# Patient Record
Sex: FEMALE | Race: WHITE | Employment: FULL TIME | URBAN - METROPOLITAN AREA
[De-identification: names, ages, dates, MRNs, and addresses within clinical notes are randomized per-mention and may not be internally consistent; named-entity substitution may affect disease eponyms.]

---

## 2019-11-20 ENCOUNTER — OFFICE VISIT (OUTPATIENT)
Dept: FAMILY MEDICINE CLINIC | Age: 64
End: 2019-11-20
Payer: COMMERCIAL

## 2019-11-20 VITALS
SYSTOLIC BLOOD PRESSURE: 154 MMHG | OXYGEN SATURATION: 97 % | RESPIRATION RATE: 16 BRPM | WEIGHT: 132 LBS | HEART RATE: 61 BPM | DIASTOLIC BLOOD PRESSURE: 90 MMHG | TEMPERATURE: 97 F

## 2019-11-20 DIAGNOSIS — J01.90 ACUTE BACTERIAL SINUSITIS: Primary | ICD-10-CM

## 2019-11-20 DIAGNOSIS — B96.89 ACUTE BACTERIAL SINUSITIS: Primary | ICD-10-CM

## 2019-11-20 PROCEDURE — 99213 OFFICE O/P EST LOW 20 MIN: CPT | Performed by: PHYSICIAN ASSISTANT

## 2019-11-20 RX ORDER — LANSOPRAZOLE 15 MG/1
CAPSULE, DELAYED RELEASE ORAL
COMMUNITY
End: 2020-03-03

## 2019-11-20 RX ORDER — AMOXICILLIN 500 MG/1
500 CAPSULE ORAL 3 TIMES DAILY
Qty: 30 CAPSULE | Refills: 0 | Status: SHIPPED | OUTPATIENT
Start: 2019-11-20 | End: 2019-11-30

## 2019-11-20 RX ORDER — METHYLPREDNISOLONE 4 MG/1
TABLET ORAL
Qty: 1 KIT | Refills: 0 | Status: SHIPPED
Start: 2019-11-20 | End: 2020-03-03

## 2019-11-20 RX ORDER — ATENOLOL 50 MG/1
TABLET ORAL
COMMUNITY
Start: 2019-09-30 | End: 2020-03-03 | Stop reason: SDUPTHER

## 2019-11-20 RX ORDER — ATORVASTATIN CALCIUM 10 MG/1
TABLET, FILM COATED ORAL
COMMUNITY
Start: 2019-09-30 | End: 2020-03-03 | Stop reason: SDUPTHER

## 2019-11-20 RX ORDER — LEVOTHYROXINE SODIUM 25 MCG
TABLET ORAL
COMMUNITY
Start: 2019-09-30 | End: 2020-03-03 | Stop reason: SDUPTHER

## 2019-11-20 RX ORDER — BROMPHENIRAMINE MALEATE, PSEUDOEPHEDRINE HYDROCHLORIDE, AND DEXTROMETHORPHAN HYDROBROMIDE 2; 30; 10 MG/5ML; MG/5ML; MG/5ML
5 SYRUP ORAL 4 TIMES DAILY PRN
Qty: 120 ML | Refills: 0 | Status: SHIPPED
Start: 2019-11-20 | End: 2020-03-03

## 2019-11-20 RX ORDER — ALENDRONATE SODIUM 70 MG/1
TABLET ORAL
COMMUNITY
Start: 2019-09-30 | End: 2020-03-03

## 2019-11-20 ASSESSMENT — ENCOUNTER SYMPTOMS
WHEEZING: 0
COUGH: 1
EYE PAIN: 0
APNEA: 0
CHEST TIGHTNESS: 0
SINUS PRESSURE: 1
SORE THROAT: 1
STRIDOR: 0
RHINORRHEA: 1
CHOKING: 0
EYE ITCHING: 1
EYE REDNESS: 0
GASTROINTESTINAL NEGATIVE: 1
EYE DISCHARGE: 0
FACIAL SWELLING: 0
SHORTNESS OF BREATH: 0
SINUS PAIN: 1
VOICE CHANGE: 1
TROUBLE SWALLOWING: 0

## 2020-03-03 ENCOUNTER — OFFICE VISIT (OUTPATIENT)
Dept: PRIMARY CARE CLINIC | Age: 65
End: 2020-03-03
Payer: COMMERCIAL

## 2020-03-03 VITALS
SYSTOLIC BLOOD PRESSURE: 128 MMHG | HEIGHT: 61 IN | BODY MASS INDEX: 24.17 KG/M2 | WEIGHT: 128 LBS | DIASTOLIC BLOOD PRESSURE: 78 MMHG | TEMPERATURE: 98.1 F

## 2020-03-03 PROBLEM — E78.2 MIXED HYPERLIPIDEMIA: Chronic | Status: ACTIVE | Noted: 2020-03-03

## 2020-03-03 PROBLEM — I10 ESSENTIAL HYPERTENSION: Chronic | Status: ACTIVE | Noted: 2020-03-03

## 2020-03-03 PROBLEM — E03.9 ACQUIRED HYPOTHYROIDISM: Status: ACTIVE | Noted: 2020-03-03

## 2020-03-03 PROBLEM — G57.93 NEUROPATHY OF BOTH FEET: Status: ACTIVE | Noted: 2020-03-03

## 2020-03-03 PROBLEM — G57.93 NEUROPATHY OF BOTH FEET: Chronic | Status: ACTIVE | Noted: 2020-03-03

## 2020-03-03 PROBLEM — E03.9 ACQUIRED HYPOTHYROIDISM: Chronic | Status: ACTIVE | Noted: 2020-03-03

## 2020-03-03 PROBLEM — K21.9 GASTROESOPHAGEAL REFLUX DISEASE WITHOUT ESOPHAGITIS: Status: ACTIVE | Noted: 2020-03-03

## 2020-03-03 PROBLEM — K21.9 GASTROESOPHAGEAL REFLUX DISEASE WITHOUT ESOPHAGITIS: Chronic | Status: ACTIVE | Noted: 2020-03-03

## 2020-03-03 PROBLEM — I10 ESSENTIAL HYPERTENSION: Status: ACTIVE | Noted: 2020-03-03

## 2020-03-03 PROBLEM — E78.2 MIXED HYPERLIPIDEMIA: Status: ACTIVE | Noted: 2020-03-03

## 2020-03-03 PROCEDURE — G8427 DOCREV CUR MEDS BY ELIG CLIN: HCPCS | Performed by: FAMILY MEDICINE

## 2020-03-03 PROCEDURE — 4040F PNEUMOC VAC/ADMIN/RCVD: CPT | Performed by: FAMILY MEDICINE

## 2020-03-03 PROCEDURE — 1123F ACP DISCUSS/DSCN MKR DOCD: CPT | Performed by: FAMILY MEDICINE

## 2020-03-03 PROCEDURE — 4004F PT TOBACCO SCREEN RCVD TLK: CPT | Performed by: FAMILY MEDICINE

## 2020-03-03 PROCEDURE — 1090F PRES/ABSN URINE INCON ASSESS: CPT | Performed by: FAMILY MEDICINE

## 2020-03-03 PROCEDURE — G8400 PT W/DXA NO RESULTS DOC: HCPCS | Performed by: FAMILY MEDICINE

## 2020-03-03 PROCEDURE — G8420 CALC BMI NORM PARAMETERS: HCPCS | Performed by: FAMILY MEDICINE

## 2020-03-03 PROCEDURE — G8484 FLU IMMUNIZE NO ADMIN: HCPCS | Performed by: FAMILY MEDICINE

## 2020-03-03 PROCEDURE — 3017F COLORECTAL CA SCREEN DOC REV: CPT | Performed by: FAMILY MEDICINE

## 2020-03-03 PROCEDURE — 99204 OFFICE O/P NEW MOD 45 MIN: CPT | Performed by: FAMILY MEDICINE

## 2020-03-03 RX ORDER — OMEPRAZOLE 40 MG/1
40 CAPSULE, DELAYED RELEASE ORAL
Qty: 90 CAPSULE | Refills: 12 | Status: SHIPPED
Start: 2020-03-03 | End: 2021-06-03 | Stop reason: SDUPTHER

## 2020-03-03 RX ORDER — LEVOTHYROXINE SODIUM 0.03 MG/1
25 TABLET ORAL DAILY
Qty: 90 TABLET | Refills: 5 | Status: SHIPPED
Start: 2020-03-03 | End: 2021-06-03 | Stop reason: SDUPTHER

## 2020-03-03 RX ORDER — ATORVASTATIN CALCIUM 10 MG/1
10 TABLET, FILM COATED ORAL DAILY
Qty: 90 TABLET | Refills: 5 | Status: SHIPPED
Start: 2020-03-03 | End: 2021-06-03 | Stop reason: SDUPTHER

## 2020-03-03 RX ORDER — ATENOLOL 50 MG/1
50 TABLET ORAL DAILY
Qty: 90 TABLET | Refills: 5 | Status: SHIPPED
Start: 2020-03-03 | End: 2021-06-03 | Stop reason: SDUPTHER

## 2020-03-03 ASSESSMENT — PATIENT HEALTH QUESTIONNAIRE - PHQ9
SUM OF ALL RESPONSES TO PHQ QUESTIONS 1-9: 0
SUM OF ALL RESPONSES TO PHQ QUESTIONS 1-9: 0
SUM OF ALL RESPONSES TO PHQ9 QUESTIONS 1 & 2: 0
1. LITTLE INTEREST OR PLEASURE IN DOING THINGS: 0
2. FEELING DOWN, DEPRESSED OR HOPELESS: 0

## 2020-03-03 ASSESSMENT — ENCOUNTER SYMPTOMS
RESPIRATORY NEGATIVE: 1
EYES NEGATIVE: 1
ALLERGIC/IMMUNOLOGIC NEGATIVE: 1
GASTROINTESTINAL NEGATIVE: 1

## 2020-03-03 NOTE — PROGRESS NOTES
3/3/20  Name: Rowan Mckeon    : 1955    Sex: female    Age: 75 y.o. Subjective:  Chief Complaint: Patient is here for establish as a new patient. Patient presents today to establish as a new patient. I met her last year in [facility]. 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 She has not had a physical in over a year. She has been some stress since her boyfriend of 10 years  suddenly late last Labor Day while on her couch at 45years old. She has no chest pain or shortness of breath. He does describe some numbness and tingling in her feet and legs for the past several months. She thinks is from her shoes and being on her feet so much. Medical history history is positive for hypertension hyperlipidemia hypothyroidism gastroesophageal reflux disease      Surgical history is negative. Never had a colonoscopy        Social history she is a non-smoker  for 20 years he has 3 children a boy out  Of town,  boy in town and a girl in town. Her mother  from COPD Father  from CAD and diabetes at 67. [address] name is Radha Sun  2 brothers 2 sisters are well. Weight is down 10 pounds. 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 previous doc  jono      Review of Systems   Constitutional: Negative. HENT: Negative. Eyes: Negative. Respiratory: Negative. Cardiovascular: Negative. Gastrointestinal: Negative. Endocrine: Negative. Genitourinary: Negative. Musculoskeletal: Negative. Skin: Negative. Allergic/Immunologic: Negative. Neurological: Negative. Hematological: Negative. Psychiatric/Behavioral: Negative.           Current Outpatient Medications:     lansoprazole (PREVACID) 15 MG delayed release capsule, , Disp: , Rfl:     alendronate (FOSAMAX) 70 MG tablet, , Disp: , Rfl:     atenolol (TENORMIN) 50 MG tablet, , Disp: , Rfl:     atorvastatin (LIPITOR) 10 MG tablet, , Disp: , Rfl:     SYNTHROID 25 MCG tablet, , Disp: , Rfl:     methylPREDNISolone (MEDROL DOSEPACK) 4 MG tablet, Take by mouth., Disp: 1 kit, Rfl: 0    brompheniramine-pseudoephedrine-DM 2-30-10 MG/5ML syrup, Take 5 mLs by mouth 4 times daily as needed for Congestion or Cough, Disp: 120 mL, Rfl: 0  Allergies   Allergen Reactions    Sulfa Antibiotics      Social History     Socioeconomic History    Marital status:      Spouse name: Not on file    Number of children: Not on file    Years of education: Not on file    Highest education level: Not on file   Occupational History    Not on file   Social Needs    Financial resource strain: Not on file    Food insecurity:     Worry: Not on file     Inability: Not on file    Transportation needs:     Medical: Not on file     Non-medical: Not on file   Tobacco Use    Smoking status: Never Smoker    Smokeless tobacco: Never Used   Substance and Sexual Activity    Alcohol use: Not on file    Drug use: Not on file    Sexual activity: Not on file   Lifestyle    Physical activity:     Days per week: Not on file     Minutes per session: Not on file    Stress: Not on file   Relationships    Social connections:     Talks on phone: Not on file     Gets together: Not on file     Attends Sikh service: Not on file     Active member of club or organization: Not on file     Attends meetings of clubs or organizations: Not on file     Relationship status: Not on file    Intimate partner violence:     Fear of current or ex partner: Not on file     Emotionally abused: Not on file     Physically abused: Not on file     Forced sexual activity: Not on file   Other Topics Concern    Not on file   Social History Narrative     for 20 years. 3 children.   A boy and girl in town and one boy out of town    Boyfriend of 10 years  suddenly in 2019 well on her couch at age 45. 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 Hyperlipidemia    Hypertension    Hypothyroidism    GERD    Neuropathy both feet      No past medical history on file. No family history on file. No past surgical history on file. Vitals:    03/03/20 1127   BP: 128/78   Temp: 98.1 °F (36.7 °C)   Weight: 128 lb (58.1 kg)   Height: 5' 0.5\" (1.537 m)       Objective:    Physical Exam  Vitals signs reviewed. Constitutional:       Appearance: She is well-developed. HENT:      Head: Normocephalic. Eyes:      Pupils: Pupils are equal, round, and reactive to light. Neck:      Musculoskeletal: Normal range of motion. Cardiovascular:      Rate and Rhythm: Normal rate and regular rhythm. Pulmonary:      Effort: Pulmonary effort is normal.      Breath sounds: Normal breath sounds. Abdominal:      Palpations: Abdomen is soft. Musculoskeletal: Normal range of motion. Skin:     General: Skin is warm. Neurological:      Mental Status: She is alert and oriented to person, place, and time. Psychiatric:         Behavior: Behavior normal.         Kajal Frias was seen today for establish care. Diagnoses and all orders for this visit:    Essential hypertension    Mixed hyperlipidemia    Acquired hypothyroidism    Gastroesophageal reflux disease without esophagitis    Neuropathy of both feet        Comments: We will have her obtain full laboratory studies and see me in 2 weeks for complete physical with EKG. At that time we will discuss vitamins including B12 since she is on a H2 blocker. Also colonoscopy. Discussed also at that time the risk of long-term Prilosec. Check blood pressure weekly. .  Take thyroid meds without food. A great deal of time spent reviewing medications, diet, exercise, social issues.  Also reviewing the chart before entering the room with patient and finishing charting after leaving patient's room. More than half of that time was spent face to face with the patient in counseling and coordinating care. Great deal of time spent reviewing old records establishing treatment protocol treatment plan majority of the time spent on face-to-face exam and education. Follow Up: Return in about 2 weeks (around 3/17/2020) for with ekg that day ----lab  before.      Seen by:  Billy Aaron, DO

## 2020-03-11 ENCOUNTER — HOSPITAL ENCOUNTER (OUTPATIENT)
Age: 65
Discharge: HOME | End: 2020-03-13
Payer: COMMERCIAL

## 2020-03-11 LAB
ALBUMIN SERPL-MCNC: 4.7 G/DL (ref 3.5–5.2)
ALP BLD-CCNC: 74 U/L (ref 35–104)
ALT SERPL-CCNC: 59 U/L (ref 0–32)
ANION GAP SERPL CALCULATED.3IONS-SCNC: 22 MMOL/L (ref 7–16)
AST SERPL-CCNC: 57 U/L (ref 0–31)
BACTERIA: ABNORMAL /HPF
BASOPHILS ABSOLUTE: 0.03 E9/L (ref 0–0.2)
BASOPHILS RELATIVE PERCENT: 0.6 % (ref 0–2)
BILIRUB SERPL-MCNC: 0.6 MG/DL (ref 0–1.2)
BILIRUBIN URINE: NEGATIVE
BLOOD, URINE: NEGATIVE
BUN BLDV-MCNC: 10 MG/DL (ref 8–23)
CALCIUM SERPL-MCNC: 10.2 MG/DL (ref 8.6–10.2)
CHLORIDE BLD-SCNC: 89 MMOL/L (ref 98–107)
CHOLESTEROL, TOTAL: 204 MG/DL (ref 0–199)
CLARITY: CLEAR
CO2: 21 MMOL/L (ref 22–29)
COLOR: YELLOW
CREAT SERPL-MCNC: 0.7 MG/DL (ref 0.5–1)
EOSINOPHILS ABSOLUTE: 0.04 E9/L (ref 0.05–0.5)
EOSINOPHILS RELATIVE PERCENT: 0.8 % (ref 0–6)
EPITHELIAL CELLS, UA: ABNORMAL /HPF
GFR AFRICAN AMERICAN: >60
GFR NON-AFRICAN AMERICAN: >60 ML/MIN/1.73
GLUCOSE BLD-MCNC: 82 MG/DL (ref 74–99)
GLUCOSE URINE: NEGATIVE MG/DL
HBA1C MFR BLD: 5.3 % (ref 4–5.6)
HCT VFR BLD CALC: 40.8 % (ref 34–48)
HDLC SERPL-MCNC: 98 MG/DL
HEMOGLOBIN: 13.2 G/DL (ref 11.5–15.5)
IMMATURE GRANULOCYTES #: 0.02 E9/L
IMMATURE GRANULOCYTES %: 0.4 % (ref 0–5)
KETONES, URINE: 15 MG/DL
LDL CHOLESTEROL CALCULATED: 94 MG/DL (ref 0–99)
LEUKOCYTE ESTERASE, URINE: ABNORMAL
LYMPHOCYTES ABSOLUTE: 1.57 E9/L (ref 1.5–4)
LYMPHOCYTES RELATIVE PERCENT: 33.1 % (ref 20–42)
MCH RBC QN AUTO: 31.6 PG (ref 26–35)
MCHC RBC AUTO-ENTMCNC: 32.4 % (ref 32–34.5)
MCV RBC AUTO: 97.6 FL (ref 80–99.9)
MONOCYTES ABSOLUTE: 0.47 E9/L (ref 0.1–0.95)
MONOCYTES RELATIVE PERCENT: 9.9 % (ref 2–12)
NEUTROPHILS ABSOLUTE: 2.62 E9/L (ref 1.8–7.3)
NEUTROPHILS RELATIVE PERCENT: 55.2 % (ref 43–80)
NITRITE, URINE: NEGATIVE
PDW BLD-RTO: 12.3 FL (ref 11.5–15)
PH UA: 6 (ref 5–9)
PLATELET # BLD: 217 E9/L (ref 130–450)
PMV BLD AUTO: 10.6 FL (ref 7–12)
POTASSIUM SERPL-SCNC: 4.9 MMOL/L (ref 3.5–5)
PROTEIN UA: NEGATIVE MG/DL
RBC # BLD: 4.18 E12/L (ref 3.5–5.5)
RBC UA: ABNORMAL /HPF (ref 0–2)
SODIUM BLD-SCNC: 132 MMOL/L (ref 132–146)
SPECIFIC GRAVITY UA: 1.02 (ref 1–1.03)
T4 TOTAL: 6.2 MCG/DL (ref 4.5–11.7)
TOTAL PROTEIN: 7.4 G/DL (ref 6.4–8.3)
TRIGL SERPL-MCNC: 62 MG/DL (ref 0–149)
TSH SERPL DL<=0.05 MIU/L-ACNC: 1.47 UIU/ML (ref 0.27–4.2)
UROBILINOGEN, URINE: 0.2 E.U./DL
VITAMIN B-12: 357 PG/ML (ref 211–946)
VITAMIN D 25-HYDROXY: 90 NG/ML (ref 30–100)
VLDLC SERPL CALC-MCNC: 12 MG/DL
WBC # BLD: 4.8 E9/L (ref 4.5–11.5)
WBC UA: ABNORMAL /HPF (ref 0–5)

## 2020-03-11 PROCEDURE — 80061 LIPID PANEL: CPT

## 2020-03-11 PROCEDURE — 84207 ASSAY OF VITAMIN B-6: CPT

## 2020-03-11 PROCEDURE — 85025 COMPLETE CBC W/AUTO DIFF WBC: CPT

## 2020-03-11 PROCEDURE — 83036 HEMOGLOBIN GLYCOSYLATED A1C: CPT

## 2020-03-11 PROCEDURE — 84436 ASSAY OF TOTAL THYROXINE: CPT

## 2020-03-11 PROCEDURE — 82306 VITAMIN D 25 HYDROXY: CPT

## 2020-03-11 PROCEDURE — 81001 URINALYSIS AUTO W/SCOPE: CPT

## 2020-03-11 PROCEDURE — 84443 ASSAY THYROID STIM HORMONE: CPT

## 2020-03-11 PROCEDURE — 82607 VITAMIN B-12: CPT

## 2020-03-11 PROCEDURE — 80053 COMPREHEN METABOLIC PANEL: CPT

## 2020-03-11 PROCEDURE — 36415 COLL VENOUS BLD VENIPUNCTURE: CPT

## 2020-03-16 LAB — VITAMIN B6: 61.8 NMOL/L (ref 20–125)

## 2020-03-23 ENCOUNTER — TELEPHONE (OUTPATIENT)
Dept: PRIMARY CARE CLINIC | Age: 65
End: 2020-03-23

## 2020-03-23 NOTE — TELEPHONE ENCOUNTER
Left message to cancel appt for tomorrow.   Asked that she call back to reschedule after end of May or she can schedule e-visit through [address]

## 2020-06-23 ENCOUNTER — OFFICE VISIT (OUTPATIENT)
Dept: PRIMARY CARE CLINIC | Age: 65
End: 2020-06-23
Payer: COMMERCIAL

## 2020-06-23 ENCOUNTER — HOSPITAL ENCOUNTER (OUTPATIENT)
Age: 65
Discharge: HOME | End: 2020-06-25
Payer: COMMERCIAL

## 2020-06-23 VITALS
BODY MASS INDEX: 23.41 KG/M2 | DIASTOLIC BLOOD PRESSURE: 78 MMHG | TEMPERATURE: 98.4 F | SYSTOLIC BLOOD PRESSURE: 126 MMHG | WEIGHT: 124 LBS | HEIGHT: 61 IN

## 2020-06-23 PROBLEM — R79.89 ELEVATED LIVER FUNCTION TESTS: Status: ACTIVE | Noted: 2020-06-23

## 2020-06-23 PROBLEM — R79.89 ELEVATED LIVER FUNCTION TESTS: Chronic | Status: ACTIVE | Noted: 2020-06-23

## 2020-06-23 LAB
ALBUMIN SERPL-MCNC: 4.9 G/DL (ref 3.5–5.2)
ALP BLD-CCNC: 86 U/L (ref 35–104)
ALT SERPL-CCNC: 65 U/L (ref 0–32)
ANION GAP SERPL CALCULATED.3IONS-SCNC: 18 MMOL/L (ref 7–16)
AST SERPL-CCNC: 80 U/L (ref 0–31)
BILIRUB SERPL-MCNC: 0.4 MG/DL (ref 0–1.2)
BUN BLDV-MCNC: 14 MG/DL (ref 8–23)
CALCIUM SERPL-MCNC: 10.5 MG/DL (ref 8.6–10.2)
CHLORIDE BLD-SCNC: 90 MMOL/L (ref 98–107)
CO2: 24 MMOL/L (ref 22–29)
CREAT SERPL-MCNC: 0.7 MG/DL (ref 0.5–1)
GFR AFRICAN AMERICAN: >60
GFR NON-AFRICAN AMERICAN: >60 ML/MIN/1.73
GLUCOSE BLD-MCNC: 78 MG/DL (ref 74–99)
POTASSIUM SERPL-SCNC: 5.6 MMOL/L (ref 3.5–5)
SODIUM BLD-SCNC: 132 MMOL/L (ref 132–146)
TOTAL PROTEIN: 8 G/DL (ref 6.4–8.3)

## 2020-06-23 PROCEDURE — 4040F PNEUMOC VAC/ADMIN/RCVD: CPT | Performed by: FAMILY MEDICINE

## 2020-06-23 PROCEDURE — 80053 COMPREHEN METABOLIC PANEL: CPT

## 2020-06-23 PROCEDURE — 99214 OFFICE O/P EST MOD 30 MIN: CPT | Performed by: FAMILY MEDICINE

## 2020-06-23 PROCEDURE — 93000 ELECTROCARDIOGRAM COMPLETE: CPT | Performed by: FAMILY MEDICINE

## 2020-06-23 PROCEDURE — 1090F PRES/ABSN URINE INCON ASSESS: CPT | Performed by: FAMILY MEDICINE

## 2020-06-23 PROCEDURE — 3017F COLORECTAL CA SCREEN DOC REV: CPT | Performed by: FAMILY MEDICINE

## 2020-06-23 PROCEDURE — 1123F ACP DISCUSS/DSCN MKR DOCD: CPT | Performed by: FAMILY MEDICINE

## 2020-06-23 PROCEDURE — 36415 COLL VENOUS BLD VENIPUNCTURE: CPT

## 2020-06-23 PROCEDURE — G8427 DOCREV CUR MEDS BY ELIG CLIN: HCPCS | Performed by: FAMILY MEDICINE

## 2020-06-23 PROCEDURE — 1036F TOBACCO NON-USER: CPT | Performed by: FAMILY MEDICINE

## 2020-06-23 PROCEDURE — 86803 HEPATITIS C AB TEST: CPT

## 2020-06-23 PROCEDURE — G8400 PT W/DXA NO RESULTS DOC: HCPCS | Performed by: FAMILY MEDICINE

## 2020-06-23 PROCEDURE — G8420 CALC BMI NORM PARAMETERS: HCPCS | Performed by: FAMILY MEDICINE

## 2020-06-23 ASSESSMENT — PATIENT HEALTH QUESTIONNAIRE - PHQ9
SUM OF ALL RESPONSES TO PHQ9 QUESTIONS 1 & 2: 0
2. FEELING DOWN, DEPRESSED OR HOPELESS: 0
1. LITTLE INTEREST OR PLEASURE IN DOING THINGS: 0
SUM OF ALL RESPONSES TO PHQ QUESTIONS 1-9: 0
SUM OF ALL RESPONSES TO PHQ QUESTIONS 1-9: 0

## 2020-06-23 ASSESSMENT — ENCOUNTER SYMPTOMS
EYES NEGATIVE: 1
ALLERGIC/IMMUNOLOGIC NEGATIVE: 1
GASTROINTESTINAL NEGATIVE: 1
RESPIRATORY NEGATIVE: 1

## 2020-06-23 NOTE — PROGRESS NOTES
20  Name: Claudy Lowe    : 1955    Sex: female    Age: 77 y.o. Subjective:  Chief Complaint: Patient is here for  Ck  Re  Follow up on lab     Was seen  March and  Had alb  buet  Cancelled d ue to corona virus  Lab ntoed  Ros neg  Wt dwon  4 lbs  Ros neg  Related liver function studies. Lalit on prilosec on fe yrs for  gerd   Told will need to  Try taper  admits  etoh       Beer    8  per week   never had a trasfusion        Review of Systems   Constitutional: Negative. HENT: Negative. Eyes: Negative. Respiratory: Negative. Cardiovascular: Negative. Gastrointestinal: Negative. Endocrine: Negative. Genitourinary: Negative. Musculoskeletal: Negative. Skin: Negative. Allergic/Immunologic: Negative. Neurological: Negative. Hematological: Negative. Psychiatric/Behavioral: Negative.           Current Outpatient Medications:     atenolol (TENORMIN) 50 MG tablet, Take 1 tablet by mouth daily, Disp: 90 tablet, Rfl: 5    atorvastatin (LIPITOR) 10 MG tablet, Take 1 tablet by mouth daily, Disp: 90 tablet, Rfl: 5    levothyroxine (SYNTHROID) 25 MCG tablet, Take 1 tablet by mouth Daily, Disp: 90 tablet, Rfl: 5    omeprazole (PRILOSEC) 40 MG delayed release capsule, Take 1 capsule by mouth every morning (before breakfast), Disp: 90 capsule, Rfl: 12  Allergies   Allergen Reactions    Sulfa Antibiotics      Social History     Socioeconomic History    Marital status:      Spouse name: Not on file    Number of children: Not on file    Years of education: Not on file    Highest education level: Not on file   Occupational History    Not on file   Social Needs    Financial resource strain: Not on file    Food insecurity     Worry: Not on file     Inability: Not on file    Transportation needs     Medical: Not on file     Non-medical: Not on file   Tobacco Use    Smoking status: Never Smoker    Smokeless tobacco: Never Used   Substance and Sexual soft.   Musculoskeletal: Normal range of motion. Skin:     General: Skin is warm. Neurological:      Mental Status: She is alert and oriented to person, place, and time. Psychiatric:         Behavior: Behavior normal.         Rondell Peabody was seen today for discuss labs. Diagnoses and all orders for this visit:    Essential hypertension  -     EKG 12 lead; Future  -     EKG 12 lead    Mixed hyperlipidemia    Gastroesophageal reflux disease without esophagitis    Acquired hypothyroidism    Neuropathy of both feet    Elevated liver function tests  -     Comprehensive Metabolic Panel; Future  -     HEPATITIS C ANTIBODY; Future        Comments: We will repeat liver function studies today with a hepatitis C antibody titer. We will plan on seeing her 3 months unless liver function is still elevated we will do an ultrasound to discuss after that if necessary. If any symptoms will notify discontinue all alcohol. A great deal of time spent reviewing medications, diet, exercise, social issues. Also reviewing the chart before entering the room with patient and finishing charting after leaving patient's room. More than half of that time was spent face to face with the patient in counseling and coordinating care. Check blood pressure at home weekly. Take thyroid meds without food. Exercise. Follow Up: Return in about 3 months (around 9/23/2020) for lab before.      Seen by:  Lona Mitchell DO

## 2020-06-24 LAB — HEPATITIS C ANTIBODY INTERPRETATION: NORMAL

## 2020-06-29 ENCOUNTER — TELEPHONE (OUTPATIENT)
Dept: PRIMARY CARE CLINIC | Age: 65
End: 2020-06-29

## 2020-06-29 NOTE — TELEPHONE ENCOUNTER
Notify patient the liver functions were a little higher. I put an ultrasound in.   Please send the referral staff and have her see me in 2 weeks

## 2020-07-06 ENCOUNTER — TELEPHONE (OUTPATIENT)
Dept: PRIMARY CARE CLINIC | Age: 65
End: 2020-07-06

## 2020-07-10 NOTE — TELEPHONE ENCOUNTER
Patient returning call could not hear message.     Please advise patient,  [phone]    Thank you

## 2020-10-01 ENCOUNTER — HOSPITAL ENCOUNTER (OUTPATIENT)
Age: 65
Discharge: HOME | End: 2020-10-03
Payer: COMMERCIAL

## 2020-10-01 ENCOUNTER — OFFICE VISIT (OUTPATIENT)
Dept: PRIMARY CARE CLINIC | Age: 65
End: 2020-10-01
Payer: COMMERCIAL

## 2020-10-01 VITALS
RESPIRATION RATE: 14 BRPM | WEIGHT: 114 LBS | BODY MASS INDEX: 21.52 KG/M2 | SYSTOLIC BLOOD PRESSURE: 126 MMHG | HEIGHT: 61 IN | HEART RATE: 63 BPM | TEMPERATURE: 98.4 F | OXYGEN SATURATION: 96 % | DIASTOLIC BLOOD PRESSURE: 78 MMHG

## 2020-10-01 LAB
ALBUMIN SERPL-MCNC: 4.4 G/DL (ref 3.5–5.2)
ALP BLD-CCNC: 81 U/L (ref 35–104)
ALT SERPL-CCNC: 41 U/L (ref 0–32)
ANION GAP SERPL CALCULATED.3IONS-SCNC: 15 MMOL/L (ref 7–16)
AST SERPL-CCNC: 40 U/L (ref 0–31)
BASOPHILS ABSOLUTE: 0.05 E9/L (ref 0–0.2)
BASOPHILS RELATIVE PERCENT: 0.9 % (ref 0–2)
BILIRUB SERPL-MCNC: 0.3 MG/DL (ref 0–1.2)
BUN BLDV-MCNC: 11 MG/DL (ref 8–23)
CALCIUM SERPL-MCNC: 10.3 MG/DL (ref 8.6–10.2)
CHLORIDE BLD-SCNC: 96 MMOL/L (ref 98–107)
CHOLESTEROL, TOTAL: 193 MG/DL (ref 0–199)
CO2: 25 MMOL/L (ref 22–29)
CREAT SERPL-MCNC: 0.7 MG/DL (ref 0.5–1)
EOSINOPHILS ABSOLUTE: 0.06 E9/L (ref 0.05–0.5)
EOSINOPHILS RELATIVE PERCENT: 1.1 % (ref 0–6)
GFR AFRICAN AMERICAN: >60
GFR NON-AFRICAN AMERICAN: >60 ML/MIN/1.73
GLUCOSE BLD-MCNC: 96 MG/DL (ref 74–99)
HCT VFR BLD CALC: 40.7 % (ref 34–48)
HDLC SERPL-MCNC: 83 MG/DL
HEMOGLOBIN: 13.5 G/DL (ref 11.5–15.5)
IMMATURE GRANULOCYTES #: 0.01 E9/L
IMMATURE GRANULOCYTES %: 0.2 % (ref 0–5)
LDL CHOLESTEROL CALCULATED: 91 MG/DL (ref 0–99)
LYMPHOCYTES ABSOLUTE: 1.4 E9/L (ref 1.5–4)
LYMPHOCYTES RELATIVE PERCENT: 25.8 % (ref 20–42)
MCH RBC QN AUTO: 32.5 PG (ref 26–35)
MCHC RBC AUTO-ENTMCNC: 33.2 % (ref 32–34.5)
MCV RBC AUTO: 98.1 FL (ref 80–99.9)
MONOCYTES ABSOLUTE: 0.6 E9/L (ref 0.1–0.95)
MONOCYTES RELATIVE PERCENT: 11.1 % (ref 2–12)
NEUTROPHILS ABSOLUTE: 3.3 E9/L (ref 1.8–7.3)
NEUTROPHILS RELATIVE PERCENT: 60.9 % (ref 43–80)
PDW BLD-RTO: 12.7 FL (ref 11.5–15)
PLATELET # BLD: 203 E9/L (ref 130–450)
PMV BLD AUTO: 10.4 FL (ref 7–12)
POTASSIUM SERPL-SCNC: 3.8 MMOL/L (ref 3.5–5)
RBC # BLD: 4.15 E12/L (ref 3.5–5.5)
SODIUM BLD-SCNC: 136 MMOL/L (ref 132–146)
T4 TOTAL: 5.9 MCG/DL (ref 4.5–11.7)
TOTAL PROTEIN: 6.9 G/DL (ref 6.4–8.3)
TRIGL SERPL-MCNC: 97 MG/DL (ref 0–149)
TSH SERPL DL<=0.05 MIU/L-ACNC: 2.09 UIU/ML (ref 0.27–4.2)
VITAMIN D 25-HYDROXY: 78 NG/ML (ref 30–100)
VLDLC SERPL CALC-MCNC: 19 MG/DL
WBC # BLD: 5.4 E9/L (ref 4.5–11.5)

## 2020-10-01 PROCEDURE — G8420 CALC BMI NORM PARAMETERS: HCPCS | Performed by: FAMILY MEDICINE

## 2020-10-01 PROCEDURE — 1123F ACP DISCUSS/DSCN MKR DOCD: CPT | Performed by: FAMILY MEDICINE

## 2020-10-01 PROCEDURE — 4040F PNEUMOC VAC/ADMIN/RCVD: CPT | Performed by: FAMILY MEDICINE

## 2020-10-01 PROCEDURE — 36415 COLL VENOUS BLD VENIPUNCTURE: CPT

## 2020-10-01 PROCEDURE — 84436 ASSAY OF TOTAL THYROXINE: CPT

## 2020-10-01 PROCEDURE — G8400 PT W/DXA NO RESULTS DOC: HCPCS | Performed by: FAMILY MEDICINE

## 2020-10-01 PROCEDURE — 3017F COLORECTAL CA SCREEN DOC REV: CPT | Performed by: FAMILY MEDICINE

## 2020-10-01 PROCEDURE — G8484 FLU IMMUNIZE NO ADMIN: HCPCS | Performed by: FAMILY MEDICINE

## 2020-10-01 PROCEDURE — 85025 COMPLETE CBC W/AUTO DIFF WBC: CPT

## 2020-10-01 PROCEDURE — 80061 LIPID PANEL: CPT

## 2020-10-01 PROCEDURE — G8427 DOCREV CUR MEDS BY ELIG CLIN: HCPCS | Performed by: FAMILY MEDICINE

## 2020-10-01 PROCEDURE — 82306 VITAMIN D 25 HYDROXY: CPT

## 2020-10-01 PROCEDURE — 1090F PRES/ABSN URINE INCON ASSESS: CPT | Performed by: FAMILY MEDICINE

## 2020-10-01 PROCEDURE — 99214 OFFICE O/P EST MOD 30 MIN: CPT | Performed by: FAMILY MEDICINE

## 2020-10-01 PROCEDURE — 84443 ASSAY THYROID STIM HORMONE: CPT

## 2020-10-01 PROCEDURE — 1036F TOBACCO NON-USER: CPT | Performed by: FAMILY MEDICINE

## 2020-10-01 PROCEDURE — 80053 COMPREHEN METABOLIC PANEL: CPT

## 2020-10-01 ASSESSMENT — ENCOUNTER SYMPTOMS
GASTROINTESTINAL NEGATIVE: 1
EYES NEGATIVE: 1
BACK PAIN: 1
ALLERGIC/IMMUNOLOGIC NEGATIVE: 1
RESPIRATORY NEGATIVE: 1

## 2020-10-01 ASSESSMENT — PATIENT HEALTH QUESTIONNAIRE - PHQ9
2. FEELING DOWN, DEPRESSED OR HOPELESS: 0
SUM OF ALL RESPONSES TO PHQ9 QUESTIONS 1 & 2: 0
SUM OF ALL RESPONSES TO PHQ QUESTIONS 1-9: 0
SUM OF ALL RESPONSES TO PHQ QUESTIONS 1-9: 0
1. LITTLE INTEREST OR PLEASURE IN DOING THINGS: 0

## 2020-10-01 NOTE — PROGRESS NOTES
10/1/20  Name: Kary Edward    : 1955    Sex: female    Age: 77 y.o. Subjective:  Chief Complaint: Patient is here for 3 mo ck   Re  Chol  Thyroid    And back     Feel ok  No  Cp or sob  Seeing herson santiago for  Back an d in pt nd stons better   faiedl to get my la done        Review of Systems   Constitutional: Negative. HENT: Negative. Eyes: Negative. Respiratory: Negative. Cardiovascular: Negative. Gastrointestinal: Negative. Endocrine: Negative. Genitourinary: Negative. Musculoskeletal: Positive for back pain. Skin: Negative. Allergic/Immunologic: Negative. Neurological: Negative. Hematological: Negative. Psychiatric/Behavioral: Negative.           Current Outpatient Medications:     atenolol (TENORMIN) 50 MG tablet, Take 1 tablet by mouth daily, Disp: 90 tablet, Rfl: 5    atorvastatin (LIPITOR) 10 MG tablet, Take 1 tablet by mouth daily, Disp: 90 tablet, Rfl: 5    levothyroxine (SYNTHROID) 25 MCG tablet, Take 1 tablet by mouth Daily, Disp: 90 tablet, Rfl: 5    omeprazole (PRILOSEC) 40 MG delayed release capsule, Take 1 capsule by mouth every morning (before breakfast), Disp: 90 capsule, Rfl: 12  Allergies   Allergen Reactions    Sulfa Antibiotics      Social History     Socioeconomic History    Marital status:      Spouse name: Not on file    Number of children: Not on file    Years of education: Not on file    Highest education level: Not on file   Occupational History    Not on file   Social Needs    Financial resource strain: Not on file    Food insecurity     Worry: Not on file     Inability: Not on file    Transportation needs     Medical: Not on file     Non-medical: Not on file   Tobacco Use    Smoking status: Never Smoker    Smokeless tobacco: Never Used   Substance and Sexual Activity    Alcohol use: Not on file    Drug use: Not on file    Sexual activity: Not on file   Lifestyle    Physical activity     Days per week: Not on file     Minutes per session: Not on file    Stress: Not on file   Relationships    Social connections     Talks on phone: Not on file     Gets together: Not on file     Attends Sabianism service: Not on file     Active member of club or organization: Not on file     Attends meetings of clubs or organizations: Not on file     Relationship status: Not on file    Intimate partner violence     Fear of current or ex partner: Not on file     Emotionally abused: Not on file     Physically abused: Not on file     Forced sexual activity: Not on file   Other Topics Concern    Not on file   Social History Narrative     for 20 years. 3 children. A boy and girl in town and one boy out of town    Boyfriend of 10 years  suddenly in 2019 well on her couch at age 45. 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 Hyperlipidemia    Hypertension    Hypothyroidism    GERD    Neuropathy both feet    Pt refuses colonoscopy          No past medical history on file. No family history on file. No past surgical history on file. Vitals:    10/01/20 1408   BP: 126/78   Pulse: 63   Resp: 14   Temp: 98.4 °F (36.9 °C)   TempSrc: Temporal   SpO2: 96%   Weight: 114 lb (51.7 kg)   Height: 5' 1\" (1.549 m)       Objective:    Physical Exam  Vitals signs reviewed. Constitutional:       Appearance: She is well-developed. HENT:      Head: Normocephalic. Eyes:      Pupils: Pupils are equal, round, and reactive to light. Neck:      Musculoskeletal: Normal range of motion. Cardiovascular:      Rate and Rhythm: Normal rate and regular rhythm. Pulmonary:      Effort: Pulmonary effort is normal.      Breath sounds: Normal breath sounds. Abdominal:      Palpations: Abdomen is soft.    Genitourinary:     Comments: Breast exam neg  Musculoskeletal: Normal range of motion. Skin:     General: Skin is warm. Neurological:      Mental Status: She is alert and oriented to person, place, and time. Psychiatric:         Behavior: Behavior normal.         Clarissa Mercado was seen today for discuss labs. Diagnoses and all orders for this visit:    Screen for colon cancer  -     Cologuard (For External Results Only); Future    Essential hypertension    Mixed hyperlipidemia    Elevated liver function tests    Acquired hypothyroidism        Comments:  De  eto h nd pt ref  Lab   Pt refusees colonscopy  Will allow  cologuard   A great deal of time spent reviewing medications, diet, exercise, social issues. Also reviewing the chart before entering the room with patient and finishing charting after leaving patient's room. More than half of that time was spent face to face with the patient in counseling and coordinating care. If lft still up will need us a nd poss  Referral        Follow Up: Return in about 3 months (around 1/1/2021).      Seen by:  Yolanda Patterson DO

## 2020-10-02 ENCOUNTER — TELEPHONE (OUTPATIENT)
Dept: PRIMARY CARE CLINIC | Age: 65
End: 2020-10-02

## 2020-10-29 ENCOUNTER — OFFICE VISIT (OUTPATIENT)
Dept: FAMILY MEDICINE CLINIC | Age: 65
End: 2020-10-29
Payer: COMMERCIAL

## 2020-10-29 ENCOUNTER — OFFICE VISIT (OUTPATIENT)
Dept: PODIATRY | Age: 65
End: 2020-10-29
Payer: COMMERCIAL

## 2020-10-29 VITALS — DIASTOLIC BLOOD PRESSURE: 78 MMHG | TEMPERATURE: 97.9 F | SYSTOLIC BLOOD PRESSURE: 126 MMHG

## 2020-10-29 PROCEDURE — 3017F COLORECTAL CA SCREEN DOC REV: CPT | Performed by: FAMILY MEDICINE

## 2020-10-29 PROCEDURE — 1123F ACP DISCUSS/DSCN MKR DOCD: CPT | Performed by: PODIATRIST

## 2020-10-29 PROCEDURE — G8400 PT W/DXA NO RESULTS DOC: HCPCS | Performed by: PODIATRIST

## 2020-10-29 PROCEDURE — 4040F PNEUMOC VAC/ADMIN/RCVD: CPT | Performed by: PODIATRIST

## 2020-10-29 PROCEDURE — G8420 CALC BMI NORM PARAMETERS: HCPCS | Performed by: FAMILY MEDICINE

## 2020-10-29 PROCEDURE — 1123F ACP DISCUSS/DSCN MKR DOCD: CPT | Performed by: FAMILY MEDICINE

## 2020-10-29 PROCEDURE — G8484 FLU IMMUNIZE NO ADMIN: HCPCS | Performed by: FAMILY MEDICINE

## 2020-10-29 PROCEDURE — 1090F PRES/ABSN URINE INCON ASSESS: CPT | Performed by: PODIATRIST

## 2020-10-29 PROCEDURE — 4040F PNEUMOC VAC/ADMIN/RCVD: CPT | Performed by: FAMILY MEDICINE

## 2020-10-29 PROCEDURE — G8420 CALC BMI NORM PARAMETERS: HCPCS | Performed by: PODIATRIST

## 2020-10-29 PROCEDURE — G8427 DOCREV CUR MEDS BY ELIG CLIN: HCPCS | Performed by: PODIATRIST

## 2020-10-29 PROCEDURE — G8400 PT W/DXA NO RESULTS DOC: HCPCS | Performed by: FAMILY MEDICINE

## 2020-10-29 PROCEDURE — G8427 DOCREV CUR MEDS BY ELIG CLIN: HCPCS | Performed by: FAMILY MEDICINE

## 2020-10-29 PROCEDURE — 99203 OFFICE O/P NEW LOW 30 MIN: CPT | Performed by: PODIATRIST

## 2020-10-29 PROCEDURE — 3017F COLORECTAL CA SCREEN DOC REV: CPT | Performed by: PODIATRIST

## 2020-10-29 PROCEDURE — 1090F PRES/ABSN URINE INCON ASSESS: CPT | Performed by: FAMILY MEDICINE

## 2020-10-29 PROCEDURE — 99213 OFFICE O/P EST LOW 20 MIN: CPT | Performed by: FAMILY MEDICINE

## 2020-10-29 PROCEDURE — G8484 FLU IMMUNIZE NO ADMIN: HCPCS | Performed by: PODIATRIST

## 2020-10-29 PROCEDURE — 1036F TOBACCO NON-USER: CPT | Performed by: PODIATRIST

## 2020-10-29 PROCEDURE — 1036F TOBACCO NON-USER: CPT | Performed by: FAMILY MEDICINE

## 2020-10-29 RX ORDER — TRAMADOL HYDROCHLORIDE 50 MG/1
50 TABLET ORAL 2 TIMES DAILY
Qty: 6 TABLET | Refills: 0 | Status: SHIPPED | OUTPATIENT
Start: 2020-10-29 | End: 2020-11-01

## 2020-10-29 NOTE — PROGRESS NOTES
omeprazole (PRILOSEC) 40 MG delayed release capsule Take 1 capsule by mouth every morning (before breakfast) 3/3/20  Yes David Clayton,        No past surgical history on file. No family history on file. Social History     Tobacco Use    Smoking status: Never Smoker    Smokeless tobacco: Never Used   Substance Use Topics    Alcohol use: Not on file       Review of Systems    Review of Systems:   History obtained from chart review and the patient  General ROS: negative for - chills, fatigue, fever, night sweats or weight gain  Constitutional: Negative for chills, diaphoresis, fatigue, fever and unexpected weight change. Musculoskeletal: Positive for . Neurological ROS: negative for - behavioral changes, confusion, headaches or seizures. Negative for weakness and numbness. Dermatological ROS: negative for - mole changes, rash  Cardiovascular: Negative for leg swelling. Gastrointestinal: Negative for constipation, diarrhea, nausea and vomiting. Lower Extremity Physical Examination:   Vitals:   Vitals:    10/29/20 1536   BP: 126/78   Temp: 97.9 °F (36.6 °C)        Foot Exam    General  General Appearance: appears stated age and healthy   Orientation: alert and oriented to person, place, and time   Assistance: wheelchair use       Right Foot/Ankle     Inspection and Palpation  Ecchymosis: first toe, second toe, dorsum, fourth toe, third toe and fifth toe  Tenderness: bony tenderness and metatarsals   Swelling: dorsum   Skin Exam: abnormal color; Neurovascular  Dorsalis pedis: 3+  Posterior tibial: 3+  Saphenous nerve sensation: normal  Tibial nerve sensation: normal  Superficial peroneal nerve sensation: normal  Deep peroneal nerve sensation: normal  Sural nerve sensation: normal  Achilles reflex: 2+  Babinski reflex: 2+          Ortho Exam    General: AAO x 3 in NAD. Dermatologic Exam:  Skin lesion/ulceration Absent . Skin No rashes or nodules noted. .   Musculoskeletal: Therapeutic examination revealed right foot edematous to the dorsal aspect pain with palpation at the base of the second third and fourth metatarsal pain with plantarflexion negative pain with dorsiflexion. 1Vascular:     Radiographs:  3 views x-rays were reviewed there is a nondisplaced fracture at the base of the second third and fourth metatarsal.  Foot/ankle:     Asessment: Patient is a 77 y.o. female with:    Diagnosis Orders   1. Closed nondisplaced fracture of second metatarsal bone of right foot, initial encounter  traMADol (ULTRAM) 50 MG tablet   2. Closed nondisplaced fracture of third metatarsal bone of right foot, initial encounter         Plan: Patient examined and evaluated. Did review treatment options today at this time and placing the patient in a nonweightbearing cam walker for 4 to 6 weeks. We did discuss possible nonunion nonhealing surgical correction if they do not heal patient will be compliant and stay off the foot. Cam Walker  Signed informed consent was obtained from the patient. Patient applied the cam walker to the affected limb. This device fit well. Patient was given written and verbal instructions regarding this cam walker. This is medically necessary to help reduce pain and promote and expedite healing. Current condition and treatment options discussed in detail. Discussed conservative and surgical options with the patient. Treatment options today consisted of verbal and written instructions given to patient. Contact office with any questions/problems/concerns. RTC in 2week(s).     10/29/2020    Electronically signed by Duncan Devi DPM on 10/29/2020 at 4:35 PM  10/29/2020

## 2020-10-29 NOTE — PROGRESS NOTES
10/29/20  Name: Merritt Wan    : 1955    Sex: female    Age: 77 y.o. Subjective:  Chief Complaint: Patient is here for  Right foot pain        strian right foot     Three days ago patient in a wheelchair. Accompanied by son       Review of Systems   Respiratory: Negative. Cardiovascular: Negative. Gastrointestinal: Negative. Musculoskeletal:        See HPI         Current Outpatient Medications:     traMADol (ULTRAM) 50 MG tablet, Take 1 tablet by mouth 2 times daily for 3 days. Intended supply: 3 days.  Take lowest dose possible to manage pain, Disp: 6 tablet, Rfl: 0    atenolol (TENORMIN) 50 MG tablet, Take 1 tablet by mouth daily, Disp: 90 tablet, Rfl: 5    atorvastatin (LIPITOR) 10 MG tablet, Take 1 tablet by mouth daily, Disp: 90 tablet, Rfl: 5    levothyroxine (SYNTHROID) 25 MCG tablet, Take 1 tablet by mouth Daily, Disp: 90 tablet, Rfl: 5    omeprazole (PRILOSEC) 40 MG delayed release capsule, Take 1 capsule by mouth every morning (before breakfast), Disp: 90 capsule, Rfl: 12  Allergies   Allergen Reactions    Sulfa Antibiotics      Social History     Socioeconomic History    Marital status:      Spouse name: Not on file    Number of children: Not on file    Years of education: Not on file    Highest education level: Not on file   Occupational History    Not on file   Social Needs    Financial resource strain: Not on file    Food insecurity     Worry: Not on file     Inability: Not on file    Transportation needs     Medical: Not on file     Non-medical: Not on file   Tobacco Use    Smoking status: Never Smoker    Smokeless tobacco: Never Used   Substance and Sexual Activity    Alcohol use: Not on file    Drug use: Not on file    Sexual activity: Not on file   Lifestyle    Physical activity     Days per week: Not on file     Minutes per session: Not on file    Stress: Not on file   Relationships    Social connections     Talks on phone: Not on file Gets together: Not on file     Attends Hindu service: Not on file     Active member of club or organization: Not on file     Attends meetings of clubs or organizations: Not on file     Relationship status: Not on file    Intimate partner violence     Fear of current or ex partner: Not on file     Emotionally abused: Not on file     Physically abused: Not on file     Forced sexual activity: Not on file   Other Topics Concern    Not on file   Social History Narrative     for 20 years. 3 children. A boy and girl in town and one boy out of town    Boyfriend of 10 years  suddenly in 2019 well on her couch at age 45. 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 Hyperlipidemia    Hypertension    Hypothyroidism    GERD    Neuropathy both feet    Pt refuses colonoscopy          No past medical history on file. No family history on file. No past surgical history on file. Vitals:    10/29/20 1516   BP: 128/75   Temp: 97.9 °F (36.6 °C)   TempSrc: Temporal   Weight: Comment: unable to stand-foot injury       Objective:    Physical Exam  Cardiovascular:      Rate and Rhythm: Normal rate and regular rhythm. Pulmonary:      Effort: Pulmonary effort is normal.      Breath sounds: Normal breath sounds. Musculoskeletal:      Comments: Tender with palpation over the anterior foot over the third fourth and fifth mid metatarsals         Beryl Leigh was seen today for foot pain. Diagnoses and all orders for this visit:    Sprain of right foot, initial encounter    Other orders  -     XR FOOT RIGHT (MIN 3 VIEWS); Future  -     [id number] DPM, Podiatry, [address] [place] [address]        Comments: x ray . Appointment with podiatry today.     Follow Up: Return for See Referral.     Seen by:  Darshan Moe DO

## 2020-10-29 NOTE — LETTER
[address] [address]  Phone: [phone]  Fax: [address] DPM        October 29, 2020     Tasia Degree, [address] [place] [address]    Patient: Сергей Vega  MR Number: <[id number]>  YOB: 1955  Date of Visit: 10/29/2020    Dear Dr. Dozier Degree:    Thank you for the request for consultation for [facility] to me for the evaluation of injury to right foot. Below are the relevant portions of my assessment and plan of care. If you have questions, please do not hesitate to call me. I look forward to following Jessica Tamez along with you.     Sincerely,        Aly Mckeon DPM

## 2020-10-29 NOTE — LETTER
Dawn  [address] [facility] [address]  Phone: [phone]  Fax: [phone]    Yarelis Rodgers DPM        November 2, 2020     Patient: Laura Austin   YOB: 1955   Date of Visit: 10/29/2020       To Whom it May Concern:    Boyd Pearson was seen in my clinic on 10/29/2020. She is to be off of work due to foot fx from 10/29/2020 until next appointment on 11/17/2020    If you have any questions or concerns, please don't hesitate to call.     Sincerely,         Yarelis Rodgers DPM

## 2020-10-30 VITALS — TEMPERATURE: 97.9 F | SYSTOLIC BLOOD PRESSURE: 128 MMHG | DIASTOLIC BLOOD PRESSURE: 75 MMHG

## 2020-10-30 ASSESSMENT — ENCOUNTER SYMPTOMS
GASTROINTESTINAL NEGATIVE: 1
RESPIRATORY NEGATIVE: 1

## 2020-11-05 ENCOUNTER — TELEPHONE (OUTPATIENT)
Dept: FAMILY MEDICINE CLINIC | Age: 65
End: 2020-11-05

## 2020-11-05 NOTE — TELEPHONE ENCOUNTER
Called to verify patient received cologuard test. Instructed to call office if they had not and with any questions.

## 2020-11-17 ENCOUNTER — OFFICE VISIT (OUTPATIENT)
Dept: PODIATRY | Age: 65
End: 2020-11-17
Payer: COMMERCIAL

## 2020-11-17 VITALS — TEMPERATURE: 97.5 F

## 2020-11-17 PROCEDURE — 3017F COLORECTAL CA SCREEN DOC REV: CPT | Performed by: PODIATRIST

## 2020-11-17 PROCEDURE — G8420 CALC BMI NORM PARAMETERS: HCPCS | Performed by: PODIATRIST

## 2020-11-17 PROCEDURE — 99213 OFFICE O/P EST LOW 20 MIN: CPT | Performed by: PODIATRIST

## 2020-11-17 PROCEDURE — G8427 DOCREV CUR MEDS BY ELIG CLIN: HCPCS | Performed by: PODIATRIST

## 2020-11-17 PROCEDURE — 1036F TOBACCO NON-USER: CPT | Performed by: PODIATRIST

## 2020-11-17 PROCEDURE — 1123F ACP DISCUSS/DSCN MKR DOCD: CPT | Performed by: PODIATRIST

## 2020-11-17 PROCEDURE — 4040F PNEUMOC VAC/ADMIN/RCVD: CPT | Performed by: PODIATRIST

## 2020-11-17 PROCEDURE — G8400 PT W/DXA NO RESULTS DOC: HCPCS | Performed by: PODIATRIST

## 2020-11-17 PROCEDURE — 1090F PRES/ABSN URINE INCON ASSESS: CPT | Performed by: PODIATRIST

## 2020-11-17 PROCEDURE — G8484 FLU IMMUNIZE NO ADMIN: HCPCS | Performed by: PODIATRIST

## 2020-11-17 NOTE — PROGRESS NOTES
20  Victor Hugo Iniguez : 1955 Sex: female  Age: 77 y.o. Patient was referred by Maria Antonia Mcgarry DO    Chief Complaint   Patient presents with    Foot Injury     right foot fx in cam walker since 10/29/2020    Foot Pain       SUBJECTIVE patient is seen for follow-up of a right foot fracture. Patient has been in a cam walker for 18 days feeling better  HPI  Review of Systems  Const: Denies constitutional symptoms  Musculo: Denies symptoms other than stated above  Skin: Denies symptoms other than stated above       Current Outpatient Medications:     atenolol (TENORMIN) 50 MG tablet, Take 1 tablet by mouth daily, Disp: 90 tablet, Rfl: 5    atorvastatin (LIPITOR) 10 MG tablet, Take 1 tablet by mouth daily, Disp: 90 tablet, Rfl: 5    levothyroxine (SYNTHROID) 25 MCG tablet, Take 1 tablet by mouth Daily, Disp: 90 tablet, Rfl: 5    omeprazole (PRILOSEC) 40 MG delayed release capsule, Take 1 capsule by mouth every morning (before breakfast), Disp: 90 capsule, Rfl: 12  Allergies   Allergen Reactions    Sulfa Antibiotics        No past medical history on file. No past surgical history on file. No family history on file.   Social History     Socioeconomic History    Marital status:      Spouse name: Not on file    Number of children: Not on file    Years of education: Not on file    Highest education level: Not on file   Occupational History    Not on file   Social Needs    Financial resource strain: Not on file    Food insecurity     Worry: Not on file     Inability: Not on file    Transportation needs     Medical: Not on file     Non-medical: Not on file   Tobacco Use    Smoking status: Never Smoker    Smokeless tobacco: Never Used   Substance and Sexual Activity    Alcohol use: Not on file    Drug use: Not on file    Sexual activity: Not on file   Lifestyle    Physical activity     Days per week: Not on file     Minutes per session: Not on file    Stress: Not on file Relationships    Social connections     Talks on phone: Not on file     Gets together: Not on file     Attends Taoist service: Not on file     Active member of club or organization: Not on file     Attends meetings of clubs or organizations: Not on file     Relationship status: Not on file    Intimate partner violence     Fear of current or ex partner: Not on file     Emotionally abused: Not on file     Physically abused: Not on file     Forced sexual activity: Not on file   Other Topics Concern    Not on file   Social History Narrative     for 20 years. 3 children. A boy and girl in town and one boy out of town    Boyfriend of 10 years  suddenly in 2019 well on her couch at age 45. 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 Hyperlipidemia    Hypertension    Hypothyroidism    GERD    Neuropathy both feet    Pt refuses colonoscopy           Vitals:    20 1509   Temp: 97.5 °F (36.4 °C)   TempSrc: Axillary   Weight: Comment: wheelchair       Focused Lower Extremity Physical Exam:  Vitals:    20 1509   Temp: 97.5 °F (36.4 °C)        Foot Exam    General  General Appearance: appears stated age and healthy   Orientation: alert and oriented to person, place, and time   Assistance: wheelchair use       Right Foot/Ankle     Inspection and Palpation  Ecchymosis: dorsum, second toe, third toe and fourth toe  Tenderness: metatarsals   Swelling: metatarsals   Skin Exam: skin intact;      Neurovascular  Dorsalis pedis: 3+  Posterior tibial: 3+  Saphenous nerve sensation: normal  Tibial nerve sensation: normal  Superficial peroneal nerve sensation: normal  Deep peroneal nerve sensation: normal  Sural nerve sensation: normal  Achilles reflex: 2+  Babinski reflex: 2+    Muscle Strength  Ankle dorsiflexion: 5  Ankle plantar flexion: 5  Ankle inversion: 5  Ankle eversion: 5  Great toe extension: 5  Great toe flexion: 5    Range of Motion    Normal right ankle ROM             Right Ankle Exam     Range of Motion   The patient has normal right ankle ROM. Muscle Strength   Dorsiflexion:  5/5  Plantar flexion:  5/5            Vascular: pulses  dp  pt  palapble  Capillary Refill Time:   Hair growth  Skin:    Edema:    Neurologic:      Musculoskeletal/ Orthopedic examination: Examination of the right foot revealed minimal pain with palpation to the base of the second third metatarsals. A new x-ray was reviewed again radiologist read it as nonfracture first visit it is very apparent that there are fractures at the base of the second third and fourth metatarsal today's new x-ray showed healing fractures at the base of those metatarsals  NAIL   Web space   Derm:          Assessment and Plan: Patient at this time is to stay in the cam walker nonweightbearing for 3 more weeks that be 6 weeks then we will transition her into a postop shoe. Annamary Curling was seen today for foot injury and foot pain. Diagnoses and all orders for this visit:    Closed fracture of right foot with routine healing, subsequent encounter  -     XR FOOT RIGHT (MIN 3 VIEWS); Future        Return in about 3 weeks (around 12/8/2020). Seen By:  Yarelis Rodgers DPM      Document was created using voice recognition software. Note was reviewed, however may contain grammatical errors.

## 2020-11-17 NOTE — LETTER
[address] [address]  Phone: [phone]  Fax: 073 Gage Jay DPM        November 17, 2020     Patient: Angela Johnson   YOB: 1955   Date of Visit: 11/17/2020       To Whom it May Concern:    Hernando Vega was seen in my clinic on 11/17/2020. She is off of work from 11/17/2020 until 12/3/2020 due to foot fracture     If you have any questions or concerns, please don't hesitate to call.     Sincerely,         Luciana Ramirez DPM

## 2020-11-24 ENCOUNTER — TELEPHONE (OUTPATIENT)
Dept: PRIMARY CARE CLINIC | Age: 65
End: 2020-11-24

## 2020-12-03 ENCOUNTER — OFFICE VISIT (OUTPATIENT)
Dept: PODIATRY | Age: 65
End: 2020-12-03
Payer: COMMERCIAL

## 2020-12-03 VITALS — DIASTOLIC BLOOD PRESSURE: 78 MMHG | SYSTOLIC BLOOD PRESSURE: 126 MMHG | TEMPERATURE: 97.4 F

## 2020-12-03 PROCEDURE — 99213 OFFICE O/P EST LOW 20 MIN: CPT | Performed by: PODIATRIST

## 2020-12-03 PROCEDURE — 1090F PRES/ABSN URINE INCON ASSESS: CPT | Performed by: PODIATRIST

## 2020-12-03 PROCEDURE — G8484 FLU IMMUNIZE NO ADMIN: HCPCS | Performed by: PODIATRIST

## 2020-12-03 PROCEDURE — 4040F PNEUMOC VAC/ADMIN/RCVD: CPT | Performed by: PODIATRIST

## 2020-12-03 PROCEDURE — G8427 DOCREV CUR MEDS BY ELIG CLIN: HCPCS | Performed by: PODIATRIST

## 2020-12-03 PROCEDURE — G8420 CALC BMI NORM PARAMETERS: HCPCS | Performed by: PODIATRIST

## 2020-12-03 PROCEDURE — 1123F ACP DISCUSS/DSCN MKR DOCD: CPT | Performed by: PODIATRIST

## 2020-12-03 PROCEDURE — 1036F TOBACCO NON-USER: CPT | Performed by: PODIATRIST

## 2020-12-03 PROCEDURE — 3017F COLORECTAL CA SCREEN DOC REV: CPT | Performed by: PODIATRIST

## 2020-12-03 PROCEDURE — G8400 PT W/DXA NO RESULTS DOC: HCPCS | Performed by: PODIATRIST

## 2020-12-03 NOTE — PROGRESS NOTES
20  Andrew Lydia Iniguez : 1955 Sex: female  Age: 77 y.o. Patient was referred by Yu Lucero DO    Chief Complaint   Patient presents with    Foot Injury     F/U RIGHT FOOT FX HAS BEEN IN CAM [address] [place] [address] 10/29    Foot Pain       SUBJECTIVE patient is seen for follow-up of a right foot fracture. Patient has been in a cam walker for  5 weeks  feeling better  HPI  Review of Systems  Const: Denies constitutional symptoms  Musculo: Denies symptoms other than stated above  Skin: Denies symptoms other than stated above       Current Outpatient Medications:     atenolol (TENORMIN) 50 MG tablet, Take 1 tablet by mouth daily, Disp: 90 tablet, Rfl: 5    atorvastatin (LIPITOR) 10 MG tablet, Take 1 tablet by mouth daily, Disp: 90 tablet, Rfl: 5    levothyroxine (SYNTHROID) 25 MCG tablet, Take 1 tablet by mouth Daily, Disp: 90 tablet, Rfl: 5    omeprazole (PRILOSEC) 40 MG delayed release capsule, Take 1 capsule by mouth every morning (before breakfast), Disp: 90 capsule, Rfl: 12  Allergies   Allergen Reactions    Sulfa Antibiotics        No past medical history on file. No past surgical history on file. No family history on file.   Social History     Socioeconomic History    Marital status:      Spouse name: Not on file    Number of children: Not on file    Years of education: Not on file    Highest education level: Not on file   Occupational History    Not on file   Social Needs    Financial resource strain: Not on file    Food insecurity     Worry: Not on file     Inability: Not on file    Transportation needs     Medical: Not on file     Non-medical: Not on file   Tobacco Use    Smoking status: Never Smoker    Smokeless tobacco: Never Used   Substance and Sexual Activity    Alcohol use: Not on file    Drug use: Not on file    Sexual activity: Not on file   Lifestyle    Physical activity     Days per week: Not on file     Minutes per session: Not on file    Stress: Not on file   Relationships    Social connections     Talks on phone: Not on file     Gets together: Not on file     Attends Jew service: Not on file     Active member of club or organization: Not on file     Attends meetings of clubs or organizations: Not on file     Relationship status: Not on file    Intimate partner violence     Fear of current or ex partner: Not on file     Emotionally abused: Not on file     Physically abused: Not on file     Forced sexual activity: Not on file   Other Topics Concern    Not on file   Social History Narrative     for 20 years. 3 children. A boy and girl in town and one boy out of town    Boyfriend of 10 years  suddenly in 2019 well on her couch at age 45. 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 Hyperlipidemia    Hypertension    Hypothyroidism    GERD    Neuropathy both feet    Pt refuses colonoscopy           Vitals:    20 1315   BP: 126/78   Temp: 97.4 °F (36.3 °C)   TempSrc: Temporal   Weight: Comment: WHEELCHAIR       Focused Lower Extremity Physical Exam:  Vitals:    20 1315   BP: 126/78   Temp: 97.4 °F (36.3 °C)        Foot Exam    General  General Appearance: appears stated age and healthy   Orientation: alert and oriented to person, place, and time   Assistance: wheelchair use       Right Foot/Ankle     Inspection and Palpation  Ecchymosis: dorsum, second toe, third toe and fourth toe  Tenderness: metatarsals   Swelling: metatarsals   Skin Exam: skin intact;      Neurovascular  Dorsalis pedis: 3+  Posterior tibial: 3+  Saphenous nerve sensation: normal  Tibial nerve sensation: normal  Superficial peroneal nerve sensation: normal  Deep peroneal nerve sensation: normal  Sural nerve sensation: normal  Achilles reflex: 2+  Babinski reflex: tissue edema forefoot is resolved. No acute osseous abnormality. Assessment and Plan: Patient at this time is to stay in the cam walker 2 more weeks weightbearing is allowed. We will transition into a tennis shoe in 2 weeks. Adine Cowden was seen today for foot injury and foot pain. Diagnoses and all orders for this visit:    Closed fracture of right foot with routine healing, subsequent encounter  -     XR FOOT RIGHT (MIN 3 VIEWS); Future        Return in about 2 weeks (around 12/17/2020). Seen By:  Tanya Villalta DPM      Document was created using voice recognition software. Note was reviewed, however may contain grammatical errors.

## 2020-12-23 ENCOUNTER — OFFICE VISIT (OUTPATIENT)
Dept: PODIATRY | Age: 65
End: 2020-12-23
Payer: COMMERCIAL

## 2020-12-23 VITALS — DIASTOLIC BLOOD PRESSURE: 82 MMHG | SYSTOLIC BLOOD PRESSURE: 118 MMHG | TEMPERATURE: 97 F

## 2020-12-23 PROCEDURE — 1123F ACP DISCUSS/DSCN MKR DOCD: CPT | Performed by: PODIATRIST

## 2020-12-23 PROCEDURE — G8484 FLU IMMUNIZE NO ADMIN: HCPCS | Performed by: PODIATRIST

## 2020-12-23 PROCEDURE — G8400 PT W/DXA NO RESULTS DOC: HCPCS | Performed by: PODIATRIST

## 2020-12-23 PROCEDURE — 1090F PRES/ABSN URINE INCON ASSESS: CPT | Performed by: PODIATRIST

## 2020-12-23 PROCEDURE — 99213 OFFICE O/P EST LOW 20 MIN: CPT | Performed by: PODIATRIST

## 2020-12-23 PROCEDURE — G8427 DOCREV CUR MEDS BY ELIG CLIN: HCPCS | Performed by: PODIATRIST

## 2020-12-23 PROCEDURE — 1036F TOBACCO NON-USER: CPT | Performed by: PODIATRIST

## 2020-12-23 PROCEDURE — 4040F PNEUMOC VAC/ADMIN/RCVD: CPT | Performed by: PODIATRIST

## 2020-12-23 PROCEDURE — 3017F COLORECTAL CA SCREEN DOC REV: CPT | Performed by: PODIATRIST

## 2020-12-23 PROCEDURE — G8420 CALC BMI NORM PARAMETERS: HCPCS | Performed by: PODIATRIST

## 2020-12-23 NOTE — PROGRESS NOTES
20  Joaquin Iniguez : 1955 Sex: female  Age: 77 y.o. Patient was referred by Nila Payne DO    Chief Complaint   Patient presents with    Foot Injury     right foot fx has been in cam walker since 10/29    Foot Pain       SUBJECTIVE patient is seen for follow-up of a right foot fracture. Patient has been in a cam walker for  8 weeks pt has no pain    Foot Injury     Foot Pain       Review of Systems  Const: Denies constitutional symptoms  Musculo: Denies symptoms other than stated above  Skin: Denies symptoms other than stated above       Current Outpatient Medications:     atenolol (TENORMIN) 50 MG tablet, Take 1 tablet by mouth daily, Disp: 90 tablet, Rfl: 5    atorvastatin (LIPITOR) 10 MG tablet, Take 1 tablet by mouth daily, Disp: 90 tablet, Rfl: 5    levothyroxine (SYNTHROID) 25 MCG tablet, Take 1 tablet by mouth Daily, Disp: 90 tablet, Rfl: 5    omeprazole (PRILOSEC) 40 MG delayed release capsule, Take 1 capsule by mouth every morning (before breakfast), Disp: 90 capsule, Rfl: 12  Allergies   Allergen Reactions    Sulfa Antibiotics        No past medical history on file. No past surgical history on file. No family history on file.   Social History     Socioeconomic History    Marital status:      Spouse name: Not on file    Number of children: Not on file    Years of education: Not on file    Highest education level: Not on file   Occupational History    Not on file   Social Needs    Financial resource strain: Not on file    Food insecurity     Worry: Not on file     Inability: Not on file    Transportation needs     Medical: Not on file     Non-medical: Not on file   Tobacco Use    Smoking status: Never Smoker    Smokeless tobacco: Never Used   Substance and Sexual Activity    Alcohol use: Not on file    Drug use: Not on file    Sexual activity: Not on file   Lifestyle    Physical activity     Days per week: Not on file     Minutes per session: Not on file    Stress: Not on file   Relationships    Social connections     Talks on phone: Not on file     Gets together: Not on file     Attends Lutheran service: Not on file     Active member of club or organization: Not on file     Attends meetings of clubs or organizations: Not on file     Relationship status: Not on file    Intimate partner violence     Fear of current or ex partner: Not on file     Emotionally abused: Not on file     Physically abused: Not on file     Forced sexual activity: Not on file   Other Topics Concern    Not on file   Social History Narrative     for 20 years. 3 children. A boy and girl in town and one boy out of town    Boyfriend of 10 years  suddenly in 2019 well on her couch at age 45. 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 Hyperlipidemia    Hypertension    Hypothyroidism    GERD    Neuropathy both feet    Pt refuses colonoscopy           Vitals:    20 1518   BP: 118/82   Temp: 97 °F (36.1 °C)   TempSrc: Temporal   Weight: Comment: cam walker       Focused Lower Extremity Physical Exam:  Vitals:    20 1518   BP: 118/82   Temp: 97 °F (36.1 °C)        Foot Exam    General  General Appearance: appears stated age and healthy   Orientation: alert and oriented to person, place, and time   Assistance: wheelchair use       Right Foot/Ankle     Inspection and Palpation  Ecchymosis: none  Tenderness: none   Swelling: none   Skin Exam: skin intact;      Neurovascular  Dorsalis pedis: 3+  Posterior tibial: 3+  Saphenous nerve sensation: normal  Tibial nerve sensation: normal  Superficial peroneal nerve sensation: normal  Deep peroneal nerve sensation: normal  Sural nerve sensation: normal  Achilles reflex: 2+  Babinski reflex: 2+    Muscle Strength  Ankle dorsiflexion: 5  Ankle plantar flexion: 5  Ankle inversion: 5  Ankle eversion: 5  Great toe extension: 5  Great toe flexion: 5    Range of Motion    Normal right ankle ROM             Right Ankle Exam     Range of Motion   The patient has normal right ankle ROM. Muscle Strength   Dorsiflexion:  5/5  Plantar flexion:  5/5            Vascular: pulses  dp  pt  palapble  Capillary Refill Time:   Hair growth  Skin:    Edema:    Neurologic:      Musculoskeletal/ Orthopedic examination: Examination of the right foot revealed negative  pain with palpation to the base of the second third metatarsals. A new x-ray was reviewed again radiologist read it as nonfracture first visit it is very apparent that there are fractures at the base of the second third and fourth metatarsal today's new x-ray showed healing fractures at the base of those metatarsals  NAIL   Web space   Derm:      Xr Foot Right (min 3 Views)    Result Date: 12/3/2020  Nondisplaced healing fracture through the base of the 2nd, 3rd and 4thmetatarsals. No true transcription please put a lock or hold on this ally cannot sinuswithout reviewing it. Thank you    Xr Foot Right (min 3 Views)    Addendum Date: 11/18/2020    ADDENDUM: Addendum to impression Healing fractures seen within the base of the 3rd and 4th metatarsal base. There is mild increase in prominence of fracture lucency at the base of 2nd metatarsal.    Result Date: 11/18/2020  EXAMINATION: THREE XRAY VIEWS OF THE RIGHT FOOT 11/17/2020 3:13 pm COMPARISON: Foot radiograph October 29, 2020 HISTORY: ORDERING SYSTEM PROVIDED HISTORY: Closed fracture of right foot with routine healing, subsequent encounter TECHNOLOGIST PROVIDED HISTORY: Reason for exam:->non wb FINDINGS: There is no evidence of acute fracture. There is normal alignment of the tarsometatarsal joints. No acute joint abnormality. No focal osseous lesion. Interval decrease in soft tissue edema forefoot. Soft tissue edema forefoot is resolved.  No acute osseous abnormality. Assessment and Plan: After reviewing the new x-rays that the patient has no pain we are transitioning her out of the Cam walker into a tennis shoe. I still advised if she is consistent more than 1 hour to wear the cam walker. Patient is to follow-up. Steve Lama was seen today for foot injury and foot pain. Diagnoses and all orders for this visit:    Closed fracture of right foot with routine healing, subsequent encounter  -     XR FOOT RIGHT (MIN 3 VIEWS); Future        Return in about 1 month (around 1/23/2021). Seen By:  Emmy Hernadez DPM      Document was created using voice recognition software. Note was reviewed, however may contain grammatical errors.

## 2021-01-19 ENCOUNTER — OFFICE VISIT (OUTPATIENT)
Dept: PRIMARY CARE CLINIC | Age: 66
End: 2021-01-19
Payer: COMMERCIAL

## 2021-01-19 VITALS
DIASTOLIC BLOOD PRESSURE: 78 MMHG | BODY MASS INDEX: 21.14 KG/M2 | RESPIRATION RATE: 12 BRPM | WEIGHT: 112 LBS | SYSTOLIC BLOOD PRESSURE: 126 MMHG | HEIGHT: 61 IN | TEMPERATURE: 98.1 F

## 2021-01-19 DIAGNOSIS — R79.89 ELEVATED LIVER FUNCTION TESTS: Chronic | ICD-10-CM

## 2021-01-19 DIAGNOSIS — I10 ESSENTIAL HYPERTENSION: Chronic | ICD-10-CM

## 2021-01-19 DIAGNOSIS — E78.2 MIXED HYPERLIPIDEMIA: Chronic | ICD-10-CM

## 2021-01-19 DIAGNOSIS — I10 ESSENTIAL HYPERTENSION: Primary | Chronic | ICD-10-CM

## 2021-01-19 DIAGNOSIS — R73.03 PRE-DIABETES: ICD-10-CM

## 2021-01-19 DIAGNOSIS — Z12.31 ENCOUNTER FOR SCREENING MAMMOGRAM FOR MALIGNANT NEOPLASM OF BREAST: ICD-10-CM

## 2021-01-19 DIAGNOSIS — E03.9 ACQUIRED HYPOTHYROIDISM: Chronic | ICD-10-CM

## 2021-01-19 LAB
ALBUMIN SERPL-MCNC: 4.8 G/DL (ref 3.5–5.2)
ALP BLD-CCNC: 83 U/L (ref 35–104)
ALT SERPL-CCNC: 20 U/L (ref 0–32)
ANION GAP SERPL CALCULATED.3IONS-SCNC: 20 MMOL/L (ref 7–16)
AST SERPL-CCNC: 29 U/L (ref 0–31)
BASOPHILS ABSOLUTE: 0.03 E9/L (ref 0–0.2)
BASOPHILS RELATIVE PERCENT: 0.7 % (ref 0–2)
BILIRUB SERPL-MCNC: 0.6 MG/DL (ref 0–1.2)
BUN BLDV-MCNC: 12 MG/DL (ref 8–23)
CALCIUM SERPL-MCNC: 10.5 MG/DL (ref 8.6–10.2)
CHLORIDE BLD-SCNC: 92 MMOL/L (ref 98–107)
CHOLESTEROL, TOTAL: 208 MG/DL (ref 0–199)
CO2: 21 MMOL/L (ref 22–29)
CREAT SERPL-MCNC: 0.7 MG/DL (ref 0.5–1)
EOSINOPHILS ABSOLUTE: 0.02 E9/L (ref 0.05–0.5)
EOSINOPHILS RELATIVE PERCENT: 0.5 % (ref 0–6)
GFR AFRICAN AMERICAN: >60
GFR NON-AFRICAN AMERICAN: >60 ML/MIN/1.73
GLUCOSE BLD-MCNC: 85 MG/DL (ref 74–99)
HBA1C MFR BLD: 5.1 % (ref 4–5.6)
HCT VFR BLD CALC: 43.3 % (ref 34–48)
HDLC SERPL-MCNC: 89 MG/DL
HEMOGLOBIN: 14.9 G/DL (ref 11.5–15.5)
IMMATURE GRANULOCYTES #: 0.01 E9/L
IMMATURE GRANULOCYTES %: 0.2 % (ref 0–5)
LDL CHOLESTEROL CALCULATED: 100 MG/DL (ref 0–99)
LYMPHOCYTES ABSOLUTE: 1.68 E9/L (ref 1.5–4)
LYMPHOCYTES RELATIVE PERCENT: 40.2 % (ref 20–42)
MCH RBC QN AUTO: 32.5 PG (ref 26–35)
MCHC RBC AUTO-ENTMCNC: 34.4 % (ref 32–34.5)
MCV RBC AUTO: 94.3 FL (ref 80–99.9)
MONOCYTES ABSOLUTE: 0.47 E9/L (ref 0.1–0.95)
MONOCYTES RELATIVE PERCENT: 11.2 % (ref 2–12)
NEUTROPHILS ABSOLUTE: 1.97 E9/L (ref 1.8–7.3)
NEUTROPHILS RELATIVE PERCENT: 47.2 % (ref 43–80)
PDW BLD-RTO: 12.4 FL (ref 11.5–15)
PLATELET # BLD: 229 E9/L (ref 130–450)
PMV BLD AUTO: 10 FL (ref 7–12)
POTASSIUM SERPL-SCNC: 4.5 MMOL/L (ref 3.5–5)
RBC # BLD: 4.59 E12/L (ref 3.5–5.5)
SODIUM BLD-SCNC: 133 MMOL/L (ref 132–146)
T4 TOTAL: 6.2 MCG/DL (ref 4.5–11.7)
TOTAL PROTEIN: 7.5 G/DL (ref 6.4–8.3)
TRIGL SERPL-MCNC: 94 MG/DL (ref 0–149)
TSH SERPL DL<=0.05 MIU/L-ACNC: 2.24 UIU/ML (ref 0.27–4.2)
VLDLC SERPL CALC-MCNC: 19 MG/DL
WBC # BLD: 4.2 E9/L (ref 4.5–11.5)

## 2021-01-19 PROCEDURE — 3017F COLORECTAL CA SCREEN DOC REV: CPT | Performed by: FAMILY MEDICINE

## 2021-01-19 PROCEDURE — 1036F TOBACCO NON-USER: CPT | Performed by: FAMILY MEDICINE

## 2021-01-19 PROCEDURE — G8400 PT W/DXA NO RESULTS DOC: HCPCS | Performed by: FAMILY MEDICINE

## 2021-01-19 PROCEDURE — G8484 FLU IMMUNIZE NO ADMIN: HCPCS | Performed by: FAMILY MEDICINE

## 2021-01-19 PROCEDURE — G8427 DOCREV CUR MEDS BY ELIG CLIN: HCPCS | Performed by: FAMILY MEDICINE

## 2021-01-19 PROCEDURE — 1090F PRES/ABSN URINE INCON ASSESS: CPT | Performed by: FAMILY MEDICINE

## 2021-01-19 PROCEDURE — G8420 CALC BMI NORM PARAMETERS: HCPCS | Performed by: FAMILY MEDICINE

## 2021-01-19 PROCEDURE — 1123F ACP DISCUSS/DSCN MKR DOCD: CPT | Performed by: FAMILY MEDICINE

## 2021-01-19 PROCEDURE — 4040F PNEUMOC VAC/ADMIN/RCVD: CPT | Performed by: FAMILY MEDICINE

## 2021-01-19 PROCEDURE — 99214 OFFICE O/P EST MOD 30 MIN: CPT | Performed by: FAMILY MEDICINE

## 2021-01-19 ASSESSMENT — PATIENT HEALTH QUESTIONNAIRE - PHQ9
SUM OF ALL RESPONSES TO PHQ QUESTIONS 1-9: 0
SUM OF ALL RESPONSES TO PHQ9 QUESTIONS 1 & 2: 0
2. FEELING DOWN, DEPRESSED OR HOPELESS: 0

## 2021-01-19 ASSESSMENT — ENCOUNTER SYMPTOMS
EYES NEGATIVE: 1
GASTROINTESTINAL NEGATIVE: 1
ALLERGIC/IMMUNOLOGIC NEGATIVE: 1
RESPIRATORY NEGATIVE: 1

## 2021-01-19 NOTE — PROGRESS NOTES
21  Name: Kendra Vazquez    : 1955    Sex: female    Age: 77 y.o. Subjective:  Chief Complaint: Patient is here for 3 mo ck   Re  thryoid  Chol lab      Feel ok    No cp ro sob  stil off with  Foot  didnto get lab  doen yet       prob with eye   Seeing herson gonzales  At  [facility] care      Review of Systems   Constitutional: Negative. HENT: Negative. Eyes: Negative. Respiratory: Negative. Cardiovascular: Negative. Gastrointestinal: Negative. Endocrine: Negative. Genitourinary: Negative. Musculoskeletal: Negative. Some pain foot   Skin: Negative. Allergic/Immunologic: Negative. Neurological: Negative. Hematological: Negative. Psychiatric/Behavioral: Negative.           Current Outpatient Medications:     atenolol (TENORMIN) 50 MG tablet, Take 1 tablet by mouth daily, Disp: 90 tablet, Rfl: 5    atorvastatin (LIPITOR) 10 MG tablet, Take 1 tablet by mouth daily, Disp: 90 tablet, Rfl: 5    levothyroxine (SYNTHROID) 25 MCG tablet, Take 1 tablet by mouth Daily, Disp: 90 tablet, Rfl: 5    omeprazole (PRILOSEC) 40 MG delayed release capsule, Take 1 capsule by mouth every morning (before breakfast), Disp: 90 capsule, Rfl: 12  Allergies   Allergen Reactions    Sulfa Antibiotics      Social History     Socioeconomic History    Marital status:      Spouse name: Not on file    Number of children: Not on file    Years of education: Not on file    Highest education level: Not on file   Occupational History    Not on file   Social Needs    Financial resource strain: Not on file    Food insecurity     Worry: Not on file     Inability: Not on file    Transportation needs     Medical: Not on file     Non-medical: Not on file   Tobacco Use    Smoking status: Never Smoker    Smokeless tobacco: Never Used   Substance and Sexual Activity    Alcohol use: Not on file    Drug use: Not on file    Sexual activity: Not on file   Lifestyle    Physical activity Days per week: Not on file     Minutes per session: Not on file    Stress: Not on file   Relationships    Social connections     Talks on phone: Not on file     Gets together: Not on file     Attends Temple service: Not on file     Active member of club or organization: Not on file     Attends meetings of clubs or organizations: Not on file     Relationship status: Not on file    Intimate partner violence     Fear of current or ex partner: Not on file     Emotionally abused: Not on file     Physically abused: Not on file     Forced sexual activity: Not on file   Other Topics Concern    Not on file   Social History Narrative     for 20 years. 3 children. A boy and girl in town and one boy out of town    Boyfriend of 10 years  suddenly in 2019 well on her couch at age 45. 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 Hyperlipidemia    Hypertension    Hypothyroidism    GERD    Neuropathy both feet    Pt refuses colonoscopy    --cologurad given   10-20    Right foot fracture  10-20  dr pabon      No past medical history on file. No family history on file. No past surgical history on file. Vitals:    21 1352   BP: 126/78   Resp: 12   Temp: 98.1 °F (36.7 °C)   TempSrc: Temporal   Weight: 112 lb (50.8 kg)   Height: 5' 1\" (1.549 m)       Objective:    Physical Exam  Vitals signs reviewed. Constitutional:       Appearance: She is well-developed. HENT:      Head: Normocephalic. Eyes:      Pupils: Pupils are equal, round, and reactive to light. Neck:      Musculoskeletal: Normal range of motion. Cardiovascular:      Rate and Rhythm: Normal rate and regular rhythm. Pulmonary:      Effort: Pulmonary effort is normal.      Breath sounds: Normal breath sounds. Abdominal:      Palpations: Abdomen is soft.

## 2021-01-20 ENCOUNTER — TELEPHONE (OUTPATIENT)
Dept: PRIMARY CARE CLINIC | Age: 66
End: 2021-01-20

## 2021-01-27 ENCOUNTER — OFFICE VISIT (OUTPATIENT)
Dept: PODIATRY | Age: 66
End: 2021-01-27
Payer: COMMERCIAL

## 2021-01-27 VITALS — TEMPERATURE: 98.2 F | SYSTOLIC BLOOD PRESSURE: 138 MMHG | DIASTOLIC BLOOD PRESSURE: 82 MMHG

## 2021-01-27 DIAGNOSIS — S92.901D CLOSED FRACTURE OF RIGHT FOOT WITH ROUTINE HEALING, SUBSEQUENT ENCOUNTER: Primary | ICD-10-CM

## 2021-01-27 PROCEDURE — 3017F COLORECTAL CA SCREEN DOC REV: CPT | Performed by: PODIATRIST

## 2021-01-27 PROCEDURE — 99213 OFFICE O/P EST LOW 20 MIN: CPT | Performed by: PODIATRIST

## 2021-01-27 PROCEDURE — G8420 CALC BMI NORM PARAMETERS: HCPCS | Performed by: PODIATRIST

## 2021-01-27 PROCEDURE — 1036F TOBACCO NON-USER: CPT | Performed by: PODIATRIST

## 2021-01-27 PROCEDURE — G8427 DOCREV CUR MEDS BY ELIG CLIN: HCPCS | Performed by: PODIATRIST

## 2021-01-27 PROCEDURE — 4040F PNEUMOC VAC/ADMIN/RCVD: CPT | Performed by: PODIATRIST

## 2021-01-27 PROCEDURE — 1123F ACP DISCUSS/DSCN MKR DOCD: CPT | Performed by: PODIATRIST

## 2021-01-27 PROCEDURE — G8484 FLU IMMUNIZE NO ADMIN: HCPCS | Performed by: PODIATRIST

## 2021-01-27 PROCEDURE — 1090F PRES/ABSN URINE INCON ASSESS: CPT | Performed by: PODIATRIST

## 2021-01-27 PROCEDURE — G8400 PT W/DXA NO RESULTS DOC: HCPCS | Performed by: PODIATRIST

## 2021-01-27 NOTE — LETTER
[place] [address]  Phone: [phone]  Fax: [phone] Gage Jay DPM        January 27, 2021     Patient: Nydia Baig   YOB: 1955   Date of Visit: 1/27/2021       To Whom it May Concern:    Krystal Martinez was seen in my clinic on 1/27/2021. She is released to resume work on 2/1/21 if extended hours might need to wear cam walker while working per Dr. Malone Hatchet     If you have any questions or concerns, please don't hesitate to call.     Sincerely,         Roseann Perdomo DPM

## 2021-01-27 NOTE — PROGRESS NOTES
20  Kinza Iniguez : 1955 Sex: female  Age: 77 y.o. Patient was referred by Arthur Allan DO    Chief Complaint   Patient presents with    Foot Injury     right foot fx out of cam walker     Foot Pain       SUBJECTIVE patient is seen for follow-up of a right foot fracture. Patient has been out of her cam walker and wearing a tennis shoe patient states that she is having some swelling at the end of the day and some mild  Foot Injury     Foot Pain       Review of Systems  Const: Denies constitutional symptoms  Musculo: Denies symptoms other than stated above  Skin: Denies symptoms other than stated above       Current Outpatient Medications:     diclofenac sodium (VOLTAREN) 1 % GEL, Apply 4 g topically 2 times daily, Disp: 350 g, Rfl: 0    atenolol (TENORMIN) 50 MG tablet, Take 1 tablet by mouth daily, Disp: 90 tablet, Rfl: 5    atorvastatin (LIPITOR) 10 MG tablet, Take 1 tablet by mouth daily, Disp: 90 tablet, Rfl: 5    levothyroxine (SYNTHROID) 25 MCG tablet, Take 1 tablet by mouth Daily, Disp: 90 tablet, Rfl: 5    omeprazole (PRILOSEC) 40 MG delayed release capsule, Take 1 capsule by mouth every morning (before breakfast), Disp: 90 capsule, Rfl: 12  Allergies   Allergen Reactions    Sulfa Antibiotics        No past medical history on file. No past surgical history on file. No family history on file.   Social History     Socioeconomic History    Marital status:      Spouse name: Not on file    Number of children: Not on file    Years of education: Not on file    Highest education level: Not on file   Occupational History    Not on file   Social Needs    Financial resource strain: Not on file    Food insecurity     Worry: Not on file     Inability: Not on file    Transportation needs     Medical: Not on file     Non-medical: Not on file   Tobacco Use    Smoking status: Never Smoker    Smokeless tobacco: Never Used   Substance and Sexual Activity    Alcohol use: Not on file    Drug use: Not on file    Sexual activity: Not on file   Lifestyle    Physical activity     Days per week: Not on file     Minutes per session: Not on file    Stress: Not on file   Relationships    Social connections     Talks on phone: Not on file     Gets together: Not on file     Attends Jainism service: Not on file     Active member of club or organization: Not on file     Attends meetings of clubs or organizations: Not on file     Relationship status: Not on file    Intimate partner violence     Fear of current or ex partner: Not on file     Emotionally abused: Not on file     Physically abused: Not on file     Forced sexual activity: Not on file   Other Topics Concern    Not on file   Social History Narrative     for 20 years. 3 children. A boy and girl in town and one boy out of town    Boyfriend of 10 years  suddenly in 2019 well on her couch at age 45. 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 Hyperlipidemia    Hypertension    Hypothyroidism    GERD    Neuropathy both feet    Pt refuses colonoscopy    --cologurad given   10-20    Right foot fracture  10-20  dr pabon       Vitals:    21 1419   BP: 138/82   Temp: 98.2 °F (36.8 °C)   TempSrc: Temporal   Weight: Comment: REFUSED       Focused Lower Extremity Physical Exam:  Vitals:    21 1419   BP: 138/82   Temp: 98.2 °F (36.8 °C)        Foot Exam    General  General Appearance: appears stated age and healthy   Orientation: alert and oriented to person, place, and time   Assistance: wheelchair use       Right Foot/Ankle     Inspection and Palpation  Ecchymosis: none  Tenderness: none   Swelling: none   Skin Exam: skin intact;      Neurovascular  Dorsalis pedis: 3+  Posterior tibial: 3+  Saphenous nerve sensation: normal  Tibial nerve sensation: normal  Superficial peroneal nerve sensation: normal  Deep peroneal nerve sensation: normal  Sural nerve sensation: normal  Achilles reflex: 2+  Babinski reflex: 2+    Muscle Strength  Ankle dorsiflexion: 5  Ankle plantar flexion: 5  Ankle inversion: 5  Ankle eversion: 5  Great toe extension: 5  Great toe flexion: 5    Range of Motion    Normal right ankle ROM             Right Ankle Exam     Range of Motion   The patient has normal right ankle ROM. Muscle Strength   Dorsiflexion:  5/5  Plantar flexion:  5/5            Vascular: pulses  dp  pt  palapble  Capillary Refill Time:   Hair growth  Skin:    Edema:    Neurologic:      Musculoskeletal/ Orthopedic examination: Examination of the right foot revealed negative pain with palpation to the base of the second third or fourth metatarsal.  There was full range of motion including dorsiflexion plantarflexion inversion and eversion no pain noted  Web space   Derm:        New x-rays show a healed metatarsal fracture 2 3 and 4. Assessment and Plan: Today we reviewed the x-ray the patient is to continue wearing a tennis shoe I did prescribe Voltaren gel to be used twice a day once in the morning once in the evening patient is to reappoint in 1 month. Regine Whiting was seen today for foot injury and foot pain. Diagnoses and all orders for this visit:    Closed fracture of right foot with routine healing, subsequent encounter  -     XR FOOT RIGHT (MIN 3 VIEWS); Future    Other orders  -     diclofenac sodium (VOLTAREN) 1 % GEL; Apply 4 g topically 2 times daily        Return in about 2 months (around 3/27/2021). Seen By:  Greg Valdes DPM      Document was created using voice recognition software. Note was reviewed, however may contain grammatical errors.

## 2021-03-23 ENCOUNTER — OFFICE VISIT (OUTPATIENT)
Dept: PODIATRY | Age: 66
End: 2021-03-23
Payer: COMMERCIAL

## 2021-03-23 VITALS — TEMPERATURE: 97 F | DIASTOLIC BLOOD PRESSURE: 78 MMHG | SYSTOLIC BLOOD PRESSURE: 123 MMHG

## 2021-03-23 DIAGNOSIS — S92.901D CLOSED FRACTURE OF RIGHT FOOT WITH ROUTINE HEALING, SUBSEQUENT ENCOUNTER: Primary | ICD-10-CM

## 2021-03-23 PROCEDURE — G8427 DOCREV CUR MEDS BY ELIG CLIN: HCPCS | Performed by: PODIATRIST

## 2021-03-23 PROCEDURE — G8400 PT W/DXA NO RESULTS DOC: HCPCS | Performed by: PODIATRIST

## 2021-03-23 PROCEDURE — 4040F PNEUMOC VAC/ADMIN/RCVD: CPT | Performed by: PODIATRIST

## 2021-03-23 PROCEDURE — 1090F PRES/ABSN URINE INCON ASSESS: CPT | Performed by: PODIATRIST

## 2021-03-23 PROCEDURE — G8420 CALC BMI NORM PARAMETERS: HCPCS | Performed by: PODIATRIST

## 2021-03-23 PROCEDURE — 3017F COLORECTAL CA SCREEN DOC REV: CPT | Performed by: PODIATRIST

## 2021-03-23 PROCEDURE — G8484 FLU IMMUNIZE NO ADMIN: HCPCS | Performed by: PODIATRIST

## 2021-03-23 PROCEDURE — 1036F TOBACCO NON-USER: CPT | Performed by: PODIATRIST

## 2021-03-23 PROCEDURE — 99213 OFFICE O/P EST LOW 20 MIN: CPT | Performed by: PODIATRIST

## 2021-03-23 PROCEDURE — 1123F ACP DISCUSS/DSCN MKR DOCD: CPT | Performed by: PODIATRIST

## 2021-03-23 NOTE — PROGRESS NOTES
20  Annie Iniguez : 1955 Sex: female  Age: 77 y.o. Patient was referred by Praidp Sher DO    Chief Complaint   Patient presents with    Foot Injury     RIGHT FOOT FX F/U DOING GREAT NO PROBLEMS NOTED    Foot Pain       SUBJECTIVE patient is seen for follow-up of a right foot fracture. Patient has been out of her cam walker and wearing a tennis shoe patient states that she is having some swelling at the end of the day and some mild  Foot Injury     Foot Pain       Review of Systems  Const: Denies constitutional symptoms  Musculo: Denies symptoms other than stated above  Skin: Denies symptoms other than stated above       Current Outpatient Medications:     diclofenac sodium (VOLTAREN) 1 % GEL, Apply 4 g topically 2 times daily, Disp: 350 g, Rfl: 0    atenolol (TENORMIN) 50 MG tablet, Take 1 tablet by mouth daily, Disp: 90 tablet, Rfl: 5    atorvastatin (LIPITOR) 10 MG tablet, Take 1 tablet by mouth daily, Disp: 90 tablet, Rfl: 5    levothyroxine (SYNTHROID) 25 MCG tablet, Take 1 tablet by mouth Daily, Disp: 90 tablet, Rfl: 5    omeprazole (PRILOSEC) 40 MG delayed release capsule, Take 1 capsule by mouth every morning (before breakfast), Disp: 90 capsule, Rfl: 12  Allergies   Allergen Reactions    Sulfa Antibiotics        No past medical history on file. No past surgical history on file. No family history on file.   Social History     Socioeconomic History    Marital status:      Spouse name: Not on file    Number of children: Not on file    Years of education: Not on file    Highest education level: Not on file   Occupational History    Not on file   Social Needs    Financial resource strain: Not on file    Food insecurity     Worry: Not on file     Inability: Not on file    Transportation needs     Medical: Not on file     Non-medical: Not on file   Tobacco Use    Smoking status: Never Smoker    Smokeless tobacco: Never Used   Substance and Sexual Activity  Alcohol use: Not on file    Drug use: Not on file    Sexual activity: Not on file   Lifestyle    Physical activity     Days per week: Not on file     Minutes per session: Not on file    Stress: Not on file   Relationships    Social connections     Talks on phone: Not on file     Gets together: Not on file     Attends Gnosticism service: Not on file     Active member of club or organization: Not on file     Attends meetings of clubs or organizations: Not on file     Relationship status: Not on file    Intimate partner violence     Fear of current or ex partner: Not on file     Emotionally abused: Not on file     Physically abused: Not on file     Forced sexual activity: Not on file   Other Topics Concern    Not on file   Social History Narrative     for 20 years. 3 children. A boy and girl in town and one boy out of town    Boyfriend of 10 years  suddenly in 2019 well on her couch at age 45. 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 Hyperlipidemia    Hypertension    Hypothyroidism    GERD    Neuropathy both feet    Pt refuses colonoscopy    --cologurad given   10-20    Right foot fracture  10-20  dr pabon       Vitals:    21 1402   BP: 123/78   Temp: 97 °F (36.1 °C)   TempSrc: Temporal       Focused Lower Extremity Physical Exam:  Vitals:    21 1402   BP: 123/78   Temp: 97 °F (36.1 °C)        Foot Exam    General  General Appearance: appears stated age and healthy   Orientation: alert and oriented to person, place, and time   Assistance: wheelchair use       Right Foot/Ankle     Inspection and Palpation  Ecchymosis: none  Tenderness: none   Swelling: none   Skin Exam: skin intact;      Neurovascular  Dorsalis pedis: 3+  Posterior tibial: 3+  Saphenous nerve sensation: normal  Tibial nerve sensation:

## 2021-06-03 DIAGNOSIS — I10 ESSENTIAL HYPERTENSION: ICD-10-CM

## 2021-06-03 DIAGNOSIS — E78.2 MIXED HYPERLIPIDEMIA: ICD-10-CM

## 2021-06-03 DIAGNOSIS — E03.9 ACQUIRED HYPOTHYROIDISM: ICD-10-CM

## 2021-06-03 DIAGNOSIS — K21.9 GASTROESOPHAGEAL REFLUX DISEASE WITHOUT ESOPHAGITIS: ICD-10-CM

## 2021-06-03 RX ORDER — ATORVASTATIN CALCIUM 10 MG/1
10 TABLET, FILM COATED ORAL DAILY
Qty: 90 TABLET | Refills: 5 | Status: SHIPPED
Start: 2021-06-03 | End: 2021-09-21 | Stop reason: SDUPTHER

## 2021-06-03 RX ORDER — LEVOTHYROXINE SODIUM 0.03 MG/1
25 TABLET ORAL DAILY
Qty: 90 TABLET | Refills: 3 | Status: SHIPPED
Start: 2021-06-03 | End: 2021-09-21 | Stop reason: SDUPTHER

## 2021-06-03 RX ORDER — ATENOLOL 50 MG/1
50 TABLET ORAL DAILY
Qty: 90 TABLET | Refills: 3 | Status: SHIPPED
Start: 2021-06-03 | End: 2021-09-21 | Stop reason: SDUPTHER

## 2021-06-03 RX ORDER — OMEPRAZOLE 40 MG/1
40 CAPSULE, DELAYED RELEASE ORAL
Qty: 90 CAPSULE | Refills: 3 | Status: SHIPPED
Start: 2021-06-03 | End: 2021-09-21 | Stop reason: SDUPTHER

## 2021-06-17 ENCOUNTER — TELEPHONE (OUTPATIENT)
Dept: PRIMARY CARE CLINIC | Age: 66
End: 2021-06-17

## 2021-06-17 DIAGNOSIS — R79.89 ELEVATED LIVER FUNCTION TESTS: Chronic | ICD-10-CM

## 2021-06-17 DIAGNOSIS — E78.2 MIXED HYPERLIPIDEMIA: Chronic | ICD-10-CM

## 2021-06-17 DIAGNOSIS — I10 ESSENTIAL HYPERTENSION: Chronic | ICD-10-CM

## 2021-06-17 DIAGNOSIS — Z12.31 ENCOUNTER FOR SCREENING MAMMOGRAM FOR MALIGNANT NEOPLASM OF BREAST: ICD-10-CM

## 2021-08-23 ENCOUNTER — TELEPHONE (OUTPATIENT)
Dept: PRIMARY CARE CLINIC | Age: 66
End: 2021-08-23

## 2021-08-23 NOTE — TELEPHONE ENCOUNTER
Left message for patient to call the office to schedule appt.  ----- Message from Julianne Leslie sent at 8/23/2021 11:41 AM EDT -----  Subject: Appointment Request    Reason for Call: Routine Physical Exam    QUESTIONS  Type of Appointment? Established Patient  Reason for appointment request? Other - [facility] not allowed to schedule   Additional Information for Provider? Wants to schedule her yearly physical   in April 2022. Screened green  ---------------------------------------------------------------------------  --------------  CALL BACK INFO  What is the best way for the office to contact you? OK to leave message on   voicemail  Preferred Call Back Phone Number? [phone]  ---------------------------------------------------------------------------  --------------  SCRIPT ANSWERS  Relationship to Patient? Self  (If the patient has Medicare as their primary insurance coverage ask this   question) Are you requesting a Medicare Annual Wellness Visit? No  (Is the patient requesting a pap smear with their physical exam?)? No  (Is the patient requesting their annual physical and does not need PAP or   AWV per above?)? Yes   Have you been diagnosed with, awaiting test results for, or told that you   are suspected of having COVID-19 (Coronavirus)? (If patient has tested   negative or was tested as a requirement for work, school, or travel and   not based on symptoms, answer no)? No  Do you currently have flu-like symptoms including fever or chills, cough,   shortness of breath, difficulty breathing, or new loss of taste or smell? No  Have you had close contact with someone with COVID-19 in the last 14 days? No  (Service Expert - click yes below to proceed with [employer] As Usual   Scheduling)?  Yes

## 2021-09-21 DIAGNOSIS — I10 ESSENTIAL HYPERTENSION: ICD-10-CM

## 2021-09-21 DIAGNOSIS — K21.9 GASTROESOPHAGEAL REFLUX DISEASE WITHOUT ESOPHAGITIS: ICD-10-CM

## 2021-09-21 DIAGNOSIS — E78.2 MIXED HYPERLIPIDEMIA: ICD-10-CM

## 2021-09-21 DIAGNOSIS — E03.9 ACQUIRED HYPOTHYROIDISM: ICD-10-CM

## 2021-09-21 RX ORDER — ATORVASTATIN CALCIUM 10 MG/1
10 TABLET, FILM COATED ORAL DAILY
Qty: 90 TABLET | Refills: 5 | Status: SHIPPED
Start: 2021-09-21 | End: 2022-02-21 | Stop reason: SDUPTHER

## 2021-09-21 RX ORDER — LEVOTHYROXINE SODIUM 0.03 MG/1
25 TABLET ORAL DAILY
Qty: 90 TABLET | Refills: 3 | Status: SHIPPED
Start: 2021-09-21 | End: 2022-11-01 | Stop reason: SDUPTHER

## 2021-09-21 RX ORDER — OMEPRAZOLE 40 MG/1
40 CAPSULE, DELAYED RELEASE ORAL
Qty: 90 CAPSULE | Refills: 3 | Status: SHIPPED | OUTPATIENT
Start: 2021-09-21

## 2021-09-21 RX ORDER — ATENOLOL 50 MG/1
50 TABLET ORAL DAILY
Qty: 90 TABLET | Refills: 3 | Status: SHIPPED
Start: 2021-09-21 | End: 2022-11-01 | Stop reason: SDUPTHER

## 2022-01-19 ENCOUNTER — HOSPITAL ENCOUNTER (INPATIENT)
Age: 67
LOS: 2 days | Discharge: HOME | DRG: 085 | End: 2022-01-21
Attending: EMERGENCY MEDICINE | Admitting: SURGERY
Payer: COMMERCIAL

## 2022-01-19 ENCOUNTER — APPOINTMENT (OUTPATIENT)
Dept: CT IMAGING | Age: 67
End: 2022-01-19
Payer: COMMERCIAL

## 2022-01-19 ENCOUNTER — HOSPITAL ENCOUNTER (EMERGENCY)
Age: 67
Discharge: ANOTHER HOSPITAL | End: 2022-01-19
Attending: STUDENT IN AN ORGANIZED HEALTH CARE EDUCATION/TRAINING PROGRAM
Payer: COMMERCIAL

## 2022-01-19 ENCOUNTER — APPOINTMENT (OUTPATIENT)
Dept: MRI IMAGING | Age: 67
DRG: 085 | End: 2022-01-19
Payer: COMMERCIAL

## 2022-01-19 ENCOUNTER — APPOINTMENT (OUTPATIENT)
Dept: GENERAL RADIOLOGY | Age: 67
End: 2022-01-19
Payer: COMMERCIAL

## 2022-01-19 ENCOUNTER — APPOINTMENT (OUTPATIENT)
Dept: CT IMAGING | Age: 67
DRG: 085 | End: 2022-01-19
Payer: COMMERCIAL

## 2022-01-19 VITALS
HEART RATE: 86 BPM | TEMPERATURE: 98.6 F | OXYGEN SATURATION: 98 % | DIASTOLIC BLOOD PRESSURE: 86 MMHG | RESPIRATION RATE: 18 BRPM | SYSTOLIC BLOOD PRESSURE: 128 MMHG

## 2022-01-19 DIAGNOSIS — S00.11XA PERIORBITAL ECCHYMOSIS OF RIGHT EYE, INITIAL ENCOUNTER: ICD-10-CM
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
## 2022-01-19 DIAGNOSIS — I60.9 SUBARACHNOID HEMORRHAGE (HCC): ICD-10-CM

## 2022-01-19 DIAGNOSIS — T14.8XXA HEMATOMA: ICD-10-CM

## 2022-01-19 DIAGNOSIS — I62.9 INTRACRANIAL HEMORRHAGE (HCC): Primary | ICD-10-CM

## 2022-01-19 DIAGNOSIS — R41.82 ALTERED MENTAL STATUS, UNSPECIFIED ALTERED MENTAL STATUS TYPE: ICD-10-CM

## 2022-01-19 DIAGNOSIS — I61.9 INTRAPARENCHYMAL HEMORRHAGE OF BRAIN (HCC): Primary | ICD-10-CM

## 2022-01-19 DIAGNOSIS — S06.4X9A: ICD-10-CM

## 2022-01-19 LAB
ABO/RH: NORMAL
ACETAMINOPHEN LEVEL: <5 MCG/ML (ref 10–30)
ALBUMIN SERPL-MCNC: 4.9 G/DL (ref 3.5–5.2)
ALP BLD-CCNC: 77 U/L (ref 35–104)
ALT SERPL-CCNC: 26 U/L (ref 0–32)
AMPHETAMINE SCREEN, URINE: NOT DETECTED
ANGLE (CLOT STRENGTH): 65.9 DEGREE (ref 59–74)
ANGLE (CLOT STRENGTH): 71.6 DEGREE (ref 59–74)
ANION GAP SERPL CALCULATED.3IONS-SCNC: 16 MMOL/L (ref 7–16)
ANTIBODY SCREEN: NORMAL
APTT: 27.2 SEC (ref 24.5–35.1)
AST SERPL-CCNC: 28 U/L (ref 0–31)
BARBITURATE SCREEN URINE: NOT DETECTED
BASOPHILS ABSOLUTE: 0.02 E9/L (ref 0–0.2)
BASOPHILS RELATIVE PERCENT: 0.4 % (ref 0–2)
BENZODIAZEPINE SCREEN, URINE: NOT DETECTED
BILIRUB SERPL-MCNC: 0.9 MG/DL (ref 0–1.2)
BUN BLDV-MCNC: 13 MG/DL (ref 6–23)
CALCIUM SERPL-MCNC: 10.2 MG/DL (ref 8.6–10.2)
CANNABINOID SCREEN URINE: NOT DETECTED
CHLORIDE BLD-SCNC: 90 MMOL/L (ref 98–107)
CO2: 24 MMOL/L (ref 22–29)
COCAINE METABOLITE SCREEN URINE: NOT DETECTED
CREAT SERPL-MCNC: 0.5 MG/DL (ref 0.5–1)
EKG ATRIAL RATE: 80 BPM
EKG P AXIS: 62 DEGREES
EKG P-R INTERVAL: 232 MS
EKG Q-T INTERVAL: 408 MS
EKG QRS DURATION: 80 MS
EKG QTC CALCULATION (BAZETT): 470 MS
EKG R AXIS: 34 DEGREES
EKG T AXIS: 38 DEGREES
EKG VENTRICULAR RATE: 80 BPM
EOSINOPHILS ABSOLUTE: 0 E9/L (ref 0.05–0.5)
EOSINOPHILS RELATIVE PERCENT: 0 % (ref 0–6)
EPL-TEG: 1.5 % (ref 0–15)
EPL-TEG: 1.7 % (ref 0–15)
ETHANOL: <10 MG/DL (ref 0–0.08)
FENTANYL SCREEN, URINE: NOT DETECTED
G-TEG: 7.3 K D/SC (ref 4.5–11)
G-TEG: 8.8 K D/SC (ref 4.5–11)
GFR AFRICAN AMERICAN: >60
GFR NON-AFRICAN AMERICAN: >60 ML/MIN/1.73
GLUCOSE BLD-MCNC: 121 MG/DL (ref 74–99)
HCT VFR BLD CALC: 41.8 % (ref 34–48)
HEMOGLOBIN: 14.3 G/DL (ref 11.5–15.5)
IMMATURE GRANULOCYTES #: 0.01 E9/L
IMMATURE GRANULOCYTES %: 0.2 % (ref 0–5)
INR BLD: 1
K (CLOTTING TIME): 1.2 MIN (ref 1–3)
K (CLOTTING TIME): 1.8 MIN (ref 1–3)
LACTIC ACID: 1.1 MMOL/L (ref 0.5–2.2)
LY30 (FIBRINOLYSIS): 1.5 % (ref 0–8)
LY30 (FIBRINOLYSIS): 1.7 % (ref 0–8)
LYMPHOCYTES ABSOLUTE: 0.83 E9/L (ref 1.5–4)
LYMPHOCYTES RELATIVE PERCENT: 16.9 % (ref 20–42)
Lab: NORMAL
MA (MAX AMPLITUDE): 59.4 MM (ref 50–70)
MA (MAX AMPLITUDE): 63.7 MM (ref 50–70)
MCH RBC QN AUTO: 32.3 PG (ref 26–35)
MCHC RBC AUTO-ENTMCNC: 34.2 % (ref 32–34.5)
MCV RBC AUTO: 94.4 FL (ref 80–99.9)
METHADONE SCREEN, URINE: NOT DETECTED
MONOCYTES ABSOLUTE: 0.49 E9/L (ref 0.1–0.95)
MONOCYTES RELATIVE PERCENT: 10 % (ref 2–12)
NEUTROPHILS ABSOLUTE: 3.56 E9/L (ref 1.8–7.3)
NEUTROPHILS RELATIVE PERCENT: 72.5 % (ref 43–80)
OPIATE SCREEN URINE: NOT DETECTED
OXYCODONE URINE: NOT DETECTED
PDW BLD-RTO: 11.8 FL (ref 11.5–15)
PHENCYCLIDINE SCREEN URINE: NOT DETECTED
PLATELET # BLD: 170 E9/L (ref 130–450)
PMV BLD AUTO: 9.1 FL (ref 7–12)
POTASSIUM REFLEX MAGNESIUM: 3.7 MMOL/L (ref 3.5–5)
PROTHROMBIN TIME: 11.3 SEC (ref 9.3–12.4)
R (REACTION TIME): 4.3 MIN (ref 5–10)
R (REACTION TIME): 4.4 MIN (ref 5–10)
RBC # BLD: 4.43 E12/L (ref 3.5–5.5)
SALICYLATE, SERUM: <0.3 MG/DL (ref 0–30)
SARS-COV-2, NAAT: NOT DETECTED
SODIUM BLD-SCNC: 130 MMOL/L (ref 132–146)
TOTAL CK: 234 U/L (ref 20–180)
TOTAL PROTEIN: 7.9 G/DL (ref 6.4–8.3)
TRICYCLIC ANTIDEPRESSANTS SCREEN SERUM: NEGATIVE NG/ML
TROPONIN, HIGH SENSITIVITY: <6 NG/L (ref 0–9)
WBC # BLD: 4.9 E9/L (ref 4.5–11.5)

## 2022-01-19 PROCEDURE — 2580000003 HC RX 258: Performed by: STUDENT IN AN ORGANIZED HEALTH CARE EDUCATION/TRAINING PROGRAM

## 2022-01-19 PROCEDURE — 82550 ASSAY OF CK (CPK): CPT

## 2022-01-19 PROCEDURE — 86901 BLOOD TYPING SEROLOGIC RH(D): CPT

## 2022-01-19 PROCEDURE — 85576 BLOOD PLATELET AGGREGATION: CPT

## 2022-01-19 PROCEDURE — 96365 THER/PROPH/DIAG IV INF INIT: CPT

## 2022-01-19 PROCEDURE — 99222 1ST HOSP IP/OBS MODERATE 55: CPT | Performed by: NEUROLOGICAL SURGERY

## 2022-01-19 PROCEDURE — 6360000002 HC RX W HCPCS: Performed by: STUDENT IN AN ORGANIZED HEALTH CARE EDUCATION/TRAINING PROGRAM

## 2022-01-19 PROCEDURE — 83605 ASSAY OF LACTIC ACID: CPT

## 2022-01-19 PROCEDURE — 6360000004 HC RX CONTRAST MEDICATION: Performed by: RADIOLOGY

## 2022-01-19 PROCEDURE — 80053 COMPREHEN METABOLIC PANEL: CPT

## 2022-01-19 PROCEDURE — 72125 CT NECK SPINE W/O DYE: CPT

## 2022-01-19 PROCEDURE — 99283 EMERGENCY DEPT VISIT LOW MDM: CPT

## 2022-01-19 PROCEDURE — 70450 CT HEAD/BRAIN W/O DYE: CPT

## 2022-01-19 PROCEDURE — 80307 DRUG TEST PRSMV CHEM ANLYZR: CPT

## 2022-01-19 PROCEDURE — 85025 COMPLETE CBC W/AUTO DIFF WBC: CPT

## 2022-01-19 PROCEDURE — 93005 ELECTROCARDIOGRAM TRACING: CPT | Performed by: STUDENT IN AN ORGANIZED HEALTH CARE EDUCATION/TRAINING PROGRAM

## 2022-01-19 PROCEDURE — 2500000003 HC RX 250 WO HCPCS: Performed by: STUDENT IN AN ORGANIZED HEALTH CARE EDUCATION/TRAINING PROGRAM

## 2022-01-19 PROCEDURE — 86850 RBC ANTIBODY SCREEN: CPT

## 2022-01-19 PROCEDURE — 85347 COAGULATION TIME ACTIVATED: CPT

## 2022-01-19 PROCEDURE — 87081 CULTURE SCREEN ONLY: CPT

## 2022-01-19 PROCEDURE — 93010 ELECTROCARDIOGRAM REPORT: CPT | Performed by: INTERNAL MEDICINE

## 2022-01-19 PROCEDURE — 70553 MRI BRAIN STEM W/O & W/DYE: CPT

## 2022-01-19 PROCEDURE — 70486 CT MAXILLOFACIAL W/O DYE: CPT

## 2022-01-19 PROCEDURE — 99291 CRITICAL CARE FIRST HOUR: CPT | Performed by: SURGERY

## 2022-01-19 PROCEDURE — 74177 CT ABD & PELVIS W/CONTRAST: CPT

## 2022-01-19 PROCEDURE — 85384 FIBRINOGEN ACTIVITY: CPT

## 2022-01-19 PROCEDURE — 6370000000 HC RX 637 (ALT 250 FOR IP): Performed by: STUDENT IN AN ORGANIZED HEALTH CARE EDUCATION/TRAINING PROGRAM

## 2022-01-19 PROCEDURE — 2000000000 HC ICU R&B

## 2022-01-19 PROCEDURE — 71260 CT THORAX DX C+: CPT

## 2022-01-19 PROCEDURE — 72170 X-RAY EXAM OF PELVIS: CPT

## 2022-01-19 PROCEDURE — 71046 X-RAY EXAM CHEST 2 VIEWS: CPT

## 2022-01-19 PROCEDURE — 99285 EMERGENCY DEPT VISIT HI MDM: CPT

## 2022-01-19 PROCEDURE — 82077 ASSAY SPEC XCP UR&BREATH IA: CPT

## 2022-01-19 PROCEDURE — 85610 PROTHROMBIN TIME: CPT

## 2022-01-19 PROCEDURE — 86900 BLOOD TYPING SEROLOGIC ABO: CPT

## 2022-01-19 PROCEDURE — 84484 ASSAY OF TROPONIN QUANT: CPT

## 2022-01-19 PROCEDURE — 85730 THROMBOPLASTIN TIME PARTIAL: CPT

## 2022-01-19 PROCEDURE — 87635 SARS-COV-2 COVID-19 AMP PRB: CPT

## 2022-01-19 PROCEDURE — 80179 DRUG ASSAY SALICYLATE: CPT

## 2022-01-19 PROCEDURE — A9577 INJ MULTIHANCE: HCPCS | Performed by: RADIOLOGY

## 2022-01-19 PROCEDURE — 80143 DRUG ASSAY ACETAMINOPHEN: CPT

## 2022-01-19 PROCEDURE — 36415 COLL VENOUS BLD VENIPUNCTURE: CPT

## 2022-01-19 RX ORDER — 0.9 % SODIUM CHLORIDE 0.9 %
1000 INTRAVENOUS SOLUTION INTRAVENOUS ONCE
Status: COMPLETED | OUTPATIENT
Start: 2022-01-19 | End: 2022-01-19

## 2022-01-19 RX ORDER — SODIUM CHLORIDE 9 MG/ML
INJECTION, SOLUTION INTRAVENOUS CONTINUOUS
Status: DISCONTINUED | OUTPATIENT
Start: 2022-01-19 | End: 2022-01-20

## 2022-01-19 RX ORDER — LEVOTHYROXINE SODIUM 0.03 MG/1
25 TABLET ORAL DAILY
Status: DISCONTINUED | OUTPATIENT
Start: 2022-01-20 | End: 2022-01-22 | Stop reason: HOSPADM

## 2022-01-19 RX ORDER — LEVETIRACETAM 5 MG/ML
500 INJECTION INTRAVASCULAR EVERY 12 HOURS
Status: DISCONTINUED | OUTPATIENT
Start: 2022-01-19 | End: 2022-01-21

## 2022-01-19 RX ORDER — SODIUM CHLORIDE 9 MG/ML
25 INJECTION, SOLUTION INTRAVENOUS PRN
Status: DISCONTINUED | OUTPATIENT
Start: 2022-01-19 | End: 2022-01-22 | Stop reason: HOSPADM

## 2022-01-19 RX ORDER — PANTOPRAZOLE SODIUM 40 MG/1
40 TABLET, DELAYED RELEASE ORAL
Status: DISCONTINUED | OUTPATIENT
Start: 2022-01-20 | End: 2022-01-22 | Stop reason: HOSPADM

## 2022-01-19 RX ORDER — OXYCODONE HYDROCHLORIDE 5 MG/1
5 TABLET ORAL EVERY 4 HOURS PRN
Status: DISCONTINUED | OUTPATIENT
Start: 2022-01-19 | End: 2022-01-19

## 2022-01-19 RX ORDER — ACETAMINOPHEN 500 MG
1000 TABLET ORAL EVERY 8 HOURS SCHEDULED
Status: DISCONTINUED | OUTPATIENT
Start: 2022-01-19 | End: 2022-01-22 | Stop reason: HOSPADM

## 2022-01-19 RX ORDER — LEVETIRACETAM 10 MG/ML
1000 INJECTION INTRAVASCULAR ONCE
Status: COMPLETED | OUTPATIENT
Start: 2022-01-19 | End: 2022-01-19

## 2022-01-19 RX ORDER — ONDANSETRON 2 MG/ML
4 INJECTION INTRAMUSCULAR; INTRAVENOUS EVERY 6 HOURS PRN
Status: DISCONTINUED | OUTPATIENT
Start: 2022-01-19 | End: 2022-01-22 | Stop reason: HOSPADM

## 2022-01-19 RX ORDER — ONDANSETRON 4 MG/1
4 TABLET, ORALLY DISINTEGRATING ORAL EVERY 8 HOURS PRN
Status: DISCONTINUED | OUTPATIENT
Start: 2022-01-19 | End: 2022-01-22 | Stop reason: HOSPADM

## 2022-01-19 RX ORDER — ATORVASTATIN CALCIUM 20 MG/1
10 TABLET, FILM COATED ORAL DAILY
Status: DISCONTINUED | OUTPATIENT
Start: 2022-01-19 | End: 2022-01-22 | Stop reason: HOSPADM

## 2022-01-19 RX ORDER — LEVETIRACETAM 5 MG/ML
500 INJECTION INTRAVASCULAR EVERY 12 HOURS
Status: DISCONTINUED | OUTPATIENT
Start: 2022-01-20 | End: 2022-01-19

## 2022-01-19 RX ORDER — OXYCODONE HYDROCHLORIDE 10 MG/1
10 TABLET ORAL EVERY 4 HOURS PRN
Status: DISCONTINUED | OUTPATIENT
Start: 2022-01-19 | End: 2022-01-19

## 2022-01-19 RX ORDER — ATENOLOL 50 MG/1
50 TABLET ORAL DAILY
Status: DISCONTINUED | OUTPATIENT
Start: 2022-01-20 | End: 2022-01-22 | Stop reason: HOSPADM

## 2022-01-19 RX ORDER — SODIUM CHLORIDE 0.9 % (FLUSH) 0.9 %
5-40 SYRINGE (ML) INJECTION PRN
Status: DISCONTINUED | OUTPATIENT
Start: 2022-01-19 | End: 2022-01-22 | Stop reason: HOSPADM

## 2022-01-19 RX ORDER — HYDRALAZINE HYDROCHLORIDE 20 MG/ML
10 INJECTION INTRAMUSCULAR; INTRAVENOUS
Status: DISCONTINUED | OUTPATIENT
Start: 2022-01-19 | End: 2022-01-22 | Stop reason: HOSPADM

## 2022-01-19 RX ORDER — LABETALOL HYDROCHLORIDE 5 MG/ML
10 INJECTION, SOLUTION INTRAVENOUS
Status: DISCONTINUED | OUTPATIENT
Start: 2022-01-19 | End: 2022-01-22 | Stop reason: HOSPADM

## 2022-01-19 RX ORDER — LEVETIRACETAM 10 MG/ML
1000 INJECTION INTRAVASCULAR ONCE
Status: DISCONTINUED | OUTPATIENT
Start: 2022-01-19 | End: 2022-01-19

## 2022-01-19 RX ORDER — POLYETHYLENE GLYCOL 3350 17 G/17G
17 POWDER, FOR SOLUTION ORAL DAILY
Status: DISCONTINUED | OUTPATIENT
Start: 2022-01-19 | End: 2022-01-22 | Stop reason: HOSPADM

## 2022-01-19 RX ORDER — SODIUM CHLORIDE 0.9 % (FLUSH) 0.9 %
5-40 SYRINGE (ML) INJECTION EVERY 12 HOURS SCHEDULED
Status: DISCONTINUED | OUTPATIENT
Start: 2022-01-19 | End: 2022-01-22 | Stop reason: HOSPADM

## 2022-01-19 RX ADMIN — GADOBENATE DIMEGLUMINE 10 ML: 529 INJECTION, SOLUTION INTRAVENOUS at 17:28

## 2022-01-19 RX ADMIN — SODIUM CHLORIDE: 9 INJECTION, SOLUTION INTRAVENOUS at 18:33

## 2022-01-19 RX ADMIN — SODIUM CHLORIDE 4 MG/HR: 9 INJECTION, SOLUTION INTRAVENOUS at 21:46

## 2022-01-19 RX ADMIN — LEVETIRACETAM 500 MG: 5 INJECTION INTRAVENOUS at 20:21

## 2022-01-19 RX ADMIN — LEVETIRACETAM 1000 MG: 1000 INJECTION, SOLUTION INTRAVENOUS at 10:49

## 2022-01-19 RX ADMIN — SODIUM CHLORIDE 1000 ML: 9 INJECTION, SOLUTION INTRAVENOUS at 10:49

## 2022-01-19 RX ADMIN — ATORVASTATIN CALCIUM 10 MG: 20 TABLET, FILM COATED ORAL at 20:20

## 2022-01-19 RX ADMIN — ACETAMINOPHEN 1000 MG: 500 TABLET ORAL at 20:20

## 2022-01-19 RX ADMIN — POLYETHYLENE GLYCOL 3350 17 G: 17 POWDER, FOR SOLUTION ORAL at 20:21

## 2022-01-19 RX ADMIN — IOPAMIDOL 90 ML: 755 INJECTION, SOLUTION INTRAVENOUS at 15:16

## 2022-01-19 RX ADMIN — SODIUM CHLORIDE 5 MG/HR: 9 INJECTION, SOLUTION INTRAVENOUS at 11:05

## 2022-01-19 ASSESSMENT — ENCOUNTER SYMPTOMS
ABDOMINAL PAIN: 0
TROUBLE SWALLOWING: 0
NAUSEA: 1
PHOTOPHOBIA: 0
SHORTNESS OF BREATH: 0

## 2022-01-19 ASSESSMENT — PAIN SCALES - GENERAL
PAINLEVEL_OUTOF10: 0
PAINLEVEL_OUTOF10: 0

## 2022-01-19 NOTE — CONSULTS
NEUROSURGERY CONSULTATION     Chief Complaint: ICH seen on CT    HPI:   Marylu Newman is a 74 y.o.  female who presents to the ED from [facility] after  [address] was seen on CT. Patient states she was sick this past  and was throwing up. Patient also had an associated headache with this. Patient states she has since been better, but her family members urged her to get check out d/t the bruise around her right eye. Patient states she was unaware of this until her family pointed it out to her. She thinks she may have sit her eye on the toilet when throwing up on  and this is where the bruise came from. Patient does not recall any fall. Denies any recent confusion, vision loss, balance issues or neck pain. Although patient muddle on exam. She is a nonsmoker and denies any blood thinner usage. History reviewed. No pertinent past medical history. History reviewed. No pertinent surgical history. History reviewed. No pertinent family history. Social History     Socioeconomic History    Marital status:      Spouse name: Not on file    Number of children: Not on file    Years of education: Not on file    Highest education level: Not on file   Occupational History    Not on file   Tobacco Use    Smoking status: Never Smoker    Smokeless tobacco: Never Used   Vaping Use    Vaping Use: Never used   Substance and Sexual Activity    Alcohol use: Not Currently    Drug use: Not Currently    Sexual activity: Not Currently   Other Topics Concern    Not on file   Social History Narrative     for 20 years. 3 children. A boy and girl in town and one boy out of town    Boyfriend of 10 years  suddenly in 2019 well on her couch at age 45. 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     Hyperlipidemia    Hypertension Hypothyroidism    GERD    Neuropathy both feet    Pt refuses colonoscopy    --cologurad given   10-20    Right foot fracture  10-20  dr pabon     Social Determinants of Health     Financial Resource Strain:     Difficulty of Paying Living Expenses: Not on file   Food Insecurity:     Worried About Running Out of Food in the Last Year: Not on file    Ashley of Food in the Last Year: Not on file   Transportation Needs:     Lack of Transportation (Medical): Not on file    Lack of Transportation (Non-Medical):  Not on file   Physical Activity:     Days of Exercise per Week: Not on file    Minutes of Exercise per Session: Not on file   Stress:     Feeling of Stress : Not on file   Social Connections:     Frequency of Communication with Friends and Family: Not on file    Frequency of Social Gatherings with Friends and Family: Not on file    Attends Mandaen Services: Not on file    Active Member of [address] [place] [address] [employer] or Organizations: Not on file    Attends Club or Organization Meetings: Not on file    Marital Status: Not on file   Intimate Partner Violence:     Fear of Current or Ex-Partner: Not on file    Emotionally Abused: Not on file    Physically Abused: Not on file    Sexually Abused: Not on file   Housing Stability:     Unable to Pay for Housing in the Last Year: Not on file    Number of [place] in the Last Year: Not on file    Unstable Housing in the Last Year: Not on file       Medications:   Current Facility-Administered Medications   Medication Dose Route Frequency Provider Last Rate Last Admin    niCARdipine (CARDENE) 50 mg in sodium chloride 0.9 % 250 mL infusion  3-15 mg/hr IntraVENous Continuous Brock Phillips MD 25 mL/hr at 01/19/22 1320 5 mg/hr at 01/19/22 1320    iopamidol (ISOVUE-370) 76 % injection 90 mL  90 mL IntraVENous ONCE PRN Stacey Sampson II, MD        levETIRAcetam (KEPPRA) 500 mg/100 mL IVPB  500 mg IntraVENous Q12H Braden Urias,          Current Outpatient Medications Medication Sig Dispense Refill    atorvastatin (LIPITOR) 10 MG tablet Take 1 tablet by mouth daily 90 tablet 5    levothyroxine (SYNTHROID) 25 MCG tablet Take 1 tablet by mouth Daily 90 tablet 3    atenolol (TENORMIN) 50 MG tablet Take 1 tablet by mouth daily 90 tablet 3    omeprazole (PRILOSEC) 40 MG delayed release capsule Take 1 capsule by mouth every morning (before breakfast) 90 capsule 3    diclofenac sodium (VOLTAREN) 1 % GEL Apply 4 g topically 2 times daily 350 g 0        Allergies:    Sulfa antibiotics       Review of Systems   Constitutional: Negative for fever. HENT: Negative for trouble swallowing. Eyes: Negative for photophobia and visual disturbance. Respiratory: Negative for shortness of breath. Cardiovascular: Negative for chest pain. Gastrointestinal: Positive for nausea. Negative for abdominal pain. Endocrine: Negative for heat intolerance. Genitourinary: Negative for flank pain. Musculoskeletal: Negative for myalgias, neck pain and neck stiffness. Skin: Negative for wound. Neurological: Positive for headaches. Negative for dizziness and seizures. Psychiatric/Behavioral: Negative for confusion. Physical Exam  HENT:      Head: Normocephalic. Eyes:      Pupils: Pupils are equal, round, and reactive to light. Cardiovascular:      Rate and Rhythm: Normal rate. Pulmonary:      Effort: Pulmonary effort is normal.   Abdominal:      General: There is no distension. Musculoskeletal:      Cervical back: Normal range of motion. Skin:     General: Skin is warm and dry. Neurological:      Mental Status: She is alert. Comments: A&Ox3  CN3-12 intact  Motor Strength full   Sensation intact to light touch   Reflexes normal   (-) pronator drift  Rapid alternating movements intact   Psychiatric:         Thought Content:  Thought content normal.          /70   Pulse 99   Temp 98.5 °F (36.9 °C) (Oral)   Resp 18   Ht 5' 1\" (1.549 m)   Wt 115 lb (52.2 kg) LMP  (LMP Unknown)   SpO2 96%   BMI 21.73 kg/m²        Assessment:   -Joao Tierney is a 73 y/o female who CT scan showed ICH. Stable. Plan:  -Continue current care  -MRI Brain W/WO Contrast  -Could start decadron if warnted  -Please call with any questions or concerns. Electronically signed by Ellie Burroughs PA-C on 1/19/2022 at 3:08 PM     Nsx Attending:    Patient was seen and examined by me with the team.  I personally reviewed all pertinent radiological images. I concur with Miss Song's clinical assessment and plan. In brief, 74year old  presents with intractable emesis on Sunday. Patient can not recollect trauma but states she may have hit her right eye on the toilet. She denies headache, seizure, numbness, weakness or tingling. Neurologically non-focal for me on exam.  No pronator drift. CT shows extensive L frontal contusion with b/l SDH and tSAH. Plan as above with ICU admission, SBP control <140, Keppra px, MRI to r/o underlying masses. Thank you so much for allowing us to participate in the care of this patient. I certify that I spent more than half of the total time on this encounter. NOTE: This report was transcribed using voice recognition software.  Every effort was made to ensure accuracy; however, inadvertent computerized transcription errors may be present

## 2022-01-19 NOTE — PLAN OF CARE
Problem: Discharge Planning:  Goal: Participates in care planning  Description: Participates in care planning  Outcome: Met This Shift  Goal: Discharged to appropriate level of care  Description: Discharged to appropriate level of care  Outcome: Not Met This Shift     Problem: Airway Clearance - Ineffective:  Goal: Ability to maintain a clear airway will improve  Description: Ability to maintain a clear airway will improve  Outcome: Met This Shift     Problem: Anxiety/Stress:  Goal: Level of anxiety will decrease  Description: Level of anxiety will decrease  Outcome: Met This Shift     Problem: Aspiration:  Goal: Absence of aspiration  Description: Absence of aspiration  Outcome: Met This Shift     Problem: Gas Exchange - Impaired:  Goal: Levels of oxygenation will improve  Description: Levels of oxygenation will improve  Outcome: Met This Shift     Problem: Mental Status - Impaired:  Goal: Mental status will be restored to baseline  Description: Mental status will be restored to baseline  Outcome: Met This Shift     Problem: Pain:  Goal: Pain level will decrease  Description: Pain level will decrease  Outcome: Met This Shift  Goal: Recognizes and communicates pain  Description: Recognizes and communicates pain  Outcome: Met This Shift  Goal: Control of acute pain  Description: Control of acute pain  Outcome: Met This Shift     Problem: Skin Integrity - Impaired:  Goal: Will show no infection signs and symptoms  Description: Will show no infection signs and symptoms  Outcome: Met This Shift  Goal: Absence of new skin breakdown  Description: Absence of new skin breakdown  Outcome: Met This Shift     Problem: Self-Concept:  Goal: Level of anxiety will decrease  Description: Level of anxiety will decrease  Outcome: Met This Shift

## 2022-01-19 NOTE — ED NOTES
Pt in bed, HOB 45 degrees, changed into gown and placed on continuous BP (cycling q30min) and cardiac monitoring, nicardipine drip from [facility] still infusing at 2.5 mg/12.5 mL/hr, pt exam by Dr. Conrad Anaya complete. Pt is AO, denies any need or complaint.      Chris Found, RN  20/10/58 0678

## 2022-01-19 NOTE — ED PROVIDER NOTES
Department of Emergency Medicine   ED  Provider Note  Admit Date/RoomTime: 1/19/2022 12:46 PM  ED Room: 15/15          History of Present Illness:  1/19/22, Time: 2:07 PM EST  Chief Complaint   Patient presents with    Fall     tx from [facility], fall @ home and found on floor. pt with BL frontal lobe hemorrhage. on cardeine gtt @ 2.5/hr                Sanam Arredondo is a 74 y.o. female presenting to the ED for fall. Patient fell at home. Came on suddenly, nothing makes it better or worse, no loss of conscious, she is on no anticoagulation. She did strike her face. She was evaluated at [facility], and found to have a frontal lobe hemorrhage. She was started on Cardene as she was hypertensive, was trying to here for further evaluation. She was started on Keppra. Denies fever, chills, nausea, vomiting, change in bowel or bladder, neck pain or stiffness, paresthesias, or any other symptoms or complaints. Review of Systems:   Pertinent positives and negatives are stated within HPI, all other systems reviewed and are negative.        --------------------------------------------- PAST HISTORY ---------------------------------------------  Past Medical History:  has no past medical history on file. Past Surgical History:  has no past surgical history on file. Social History:  reports that she has never smoked. She has never used smokeless tobacco. She reports previous alcohol use. She reports previous drug use. Family History: family history is not on file. . Unless otherwise noted, family history is non contributory    The patients home medications have been reviewed.     Allergies: Sulfa antibiotics        ---------------------------------------------------PHYSICAL EXAM--------------------------------------    Constitutional/General: Alert and oriented x3  Head: Normocephalic and atraumatic  Eyes: PERRL, EOMI, sclera non icteric, periorbital ecchymosis on the right  Mouth: Oropharynx clear, handling secretions, no trismus, no asymmetry of the posterior oropharynx or uvular edema  Neck: Supple, full ROM, no stridor, no meningeal signs  Respiratory: Lungs clear to auscultation bilaterally, no wheezes, rales, or rhonchi. Not in respiratory distress  Cardiovascular:  Regular rate. Regular rhythm. 2+ distal pulses. Equal extremity pulses. Chest: No chest wall tenderness  GI:  Abdomen Soft, Non tender, Non distended. No rebound, guarding, or rigidity. No pulsatile masses. Musculoskeletal: Moves all extremities x 4. Warm and well perfused, no clubbing, cyanosis, or edema. Capillary refill <3 seconds  Integument: skin warm and dry. No rashes. Neurologic: GCS 15, no focal deficits, symmetric strength 5/5 in the upper and lower extremities bilaterally  Psychiatric: Normal Affect          -------------------------------------------------- RESULTS -------------------------------------------------  I have personally reviewed all laboratory and imaging results for this patient. Results are listed below. LABS: (Lab results interpreted by me)  No results found for this visit on 01/19/22.,       RADIOLOGY:  Interpreted by Radiologist unless otherwise specified  CT HEAD WO CONTRAST    (Results Pending)   CT ABDOMEN PELVIS W IV CONTRAST Additional Contrast? None    (Results Pending)   CT CHEST W CONTRAST    (Results Pending)         EKG Interpretation  Interpreted by emergency department physician, Dr. Kristin Morfin         ------------------------- NURSING NOTES AND VITALS REVIEWED ---------------------------   The nursing notes within the ED encounter and vital signs as below have been reviewed by myself  /70   Pulse 99   Temp 98.5 °F (36.9 °C) (Oral)   Resp 18   Ht 5' 1\" (1.549 m)   Wt 115 lb (52.2 kg)   LMP  (LMP Unknown)   SpO2 96%   BMI 21.73 kg/m²     Oxygen Saturation Interpretation: Normal    The patients available past medical records and past encounters were reviewed. ------------------------------ ED COURSE/MEDICAL DECISION MAKING----------------------  Medications   niCARdipine (CARDENE) 50 mg in sodium chloride 0.9 % 250 mL infusion (5 mg/hr IntraVENous Rate/Dose Change 1/19/22 1320)           The cardiac monitor revealed sinus with a heart rate in the 80s as interpreted by me. The cardiac monitor was ordered secondary to the patient's trauma and to monitor the patient for dysrhythmia. CPT [id number]         Medical Decision Making:    Trauma to evaulate. Neurosurgery consulted. Counseling: The emergency provider has spoken with the patient and discussed todays results, in addition to providing specific details for the plan of care and counseling regarding the diagnosis and prognosis. Questions are answered at this time and they are agreeable with the plan.       --------------------------------- IMPRESSION AND DISPOSITION ---------------------------------    IMPRESSION  1. Intracranial hemorrhage ([address]        DISPOSITION  Disposition: Admit to CCU/ICU  Patient condition is stable        NOTE: This report was transcribed using voice recognition software.  Every effort was made to ensure accuracy; however, inadvertent computerized transcription errors may be present       Sandy Garcia MD  01/19/22 4148

## 2022-01-19 NOTE — ED NOTES
Bed:  [place] [address]  Expected date:   Expected time:   Means of arrival:   Comments:  ems     Joyce Bourne RN  01/19/22 7475

## 2022-01-19 NOTE — H&P
TRAUMA HISTORY & PHYSICAL  Surgical Resident/Advance Practice Nurse  1/19/2022  2:56 PM    PRIMARY SURVEY    CHIEF COMPLAINT:  Trauma consult. Patient presents from [place] [address] following [address] seen on CT imaging. The patient states that on Sunday, 1/16, she began to experience nausea and vomiting. Her children visited her on the same day and told her that she had a bruise around her right eye. She denies any known trauma but states that it is possible that she may have hit her head on the toilet when vomiting. She is currently feeling better and has no complaints. She states that she presented to [facility] at the urging of her children due to her facial bruising and recent illness. Medical records state that the patient was found down at her house; however, the patient denies this. GCS 15. No neuro deficits. She does not take any blood thinning medications. Prior to transfer, CT head, face, and C-Spine were performed, which showed significant intracranial hemorrhage    AIRWAY:   Airway Normal  EMS ETT Absent  Noisy respirations Absent  Retractions: Absent  Vomiting/bleeding: Absent      BREATHING:    Midaxillary breath sound left:  Normal  Midaxillary breath sound right:  Normal    Cough sound intensity:  good   FiO2: room air  SMI 1500 mL.       CIRCULATION:   Femerol pulse intensity: Strong  Palpebral conjunctiva: Pink     Vitals:    01/19/22 1345   BP: 137/70   Pulse: 99   Resp: 18   Temp:    SpO2: 96%          FAST EXAM:  Not performed    Central Nervous System    GCS Initial 15 minutes   Eye  Motor  Verbal 4 - Opens eyes on own  6 - Follows simple motor commands  5 - Alert and oriented 4 - Opens eyes on own  6 - Follows simple motor commands  5 - Alert and oriented     Neuromuscular blockade: No  Pupil size:  Left 3 mm    Right 3 mm  Pupil reaction: Yes    Wiggles fingers: Left Yes Right Yes  Wiggles toes: Left Yes   Right Yes    Hand grasp:   Left  Present      Right  Present  Plantar flexion: Left  Present Right   Present    Loss of consciousness:  Patient unsure  History Obtained From:  Patient   Private Medical Doctor: Toy Wang DO     Pre-exisiting Medical History:  yes  HTN, hyperlipidemia, hypothyroidism        Medications:   Prior to Admission medications    Medication Sig Start Date End Date Taking? Authorizing Provider   atorvastatin (LIPITOR) 10 MG tablet Take 1 tablet by mouth daily 9/21/21   David Clayton DO   levothyroxine (SYNTHROID) 25 MCG tablet Take 1 tablet by mouth Daily 9/21/21   David Clayton,    atenolol (TENORMIN) 50 MG tablet Take 1 tablet by mouth daily 9/21/21   David Clayton, DO   omeprazole (PRILOSEC) 40 MG delayed release capsule Take 1 capsule by mouth every morning (before breakfast) 9/21/21   David Clayton DO   diclofenac sodium (VOLTAREN) 1 % GEL Apply 4 g topically 2 times daily 1/27/21   Khloe Lorenzo DPM         Allergies: Allergies   Allergen Reactions    Sulfa Antibiotics          Social History:   Drinks 5-6 beers per week. Not a daily drinker. Denies tobacco or other recreational drug use       Past Surgical History:      History reviewed. No pertinent surgical history. Anticoagulant use: no  Antiplatelet use:  no  NSAID use in last 72 hours: no  Taken PCN in past:  unknown  Last food/drink: 1/16  Last tetanus: unsure    Family History:   Not pertinent to presenting problem. No prior adverse reactions to anesthesia    Complaints:   Head:  mild  Neck:   None  Chest:   None  Back:   None  Abdomen:   None  Extremities:   None      Review of systems:  All negative unless otherwise noted. SECONDARY SURVEY  Head/scalp: Atraumatic    Face: ecchymosis surrounding right eye     Eyes/ears/nose: Atraumatic    Pharynx/mouth: Atraumatic    Neck: Atraumatic     Cervical spine tenderness:   Cervical collar not indicated  Pain:  none  ROM:  full    Chest wall:  Atraumatic    Heart:  Regular rate & rhythm    Abdomen: Atraumatic.  Soft ND  Tenderness: none    Pelvis: Atraumatic  Tenderness: none    Thoracolumbar spine: Atraumatic  Tenderness:  none    Genitourinary:  Atraumatic. No blood or urine noted    Rectum: Atraumatic. No blood noted. Perineum: Atraumatic. No blood or urine noted. Extremities:   Sensory normal  Motor normal    Distal Pulses  Left arm normal  Right arm normal  Left leg normal  Right leg normal    Capillary refill  Left arm normal  Right arm normal  Left leg normal  Right leg normal    Procedures in ED: none    In the event of Emergency Blood Transfusion:  Due to the critical condition of this patient, I request the immediate release of blood products for emergency transfusion secondary to shock. I understand the increased risks incurred by the lack of complete transfusion testing.       Radiology: repeat head CT, MRI per neurosurgery     Consultations: neurosurgery    Admission/Diagnosis: Intracranial hemorrhage    Plan of Treatment:  admit [facility]  Repeat head CT  Neurosurgery consult called  BP control      Plan discussed with Dr. Jazzmine Medley     Electronically signed by Merari Reyes DO on 1/19/2022 at 2:56 PM

## 2022-01-19 NOTE — ED PROVIDER NOTES
Department of Emergency Medicine   ED  Provider Note  Admit Date/RoomTime: 1/19/2022  9:58 AM  ED Room: [facility]/[place] [address]          History of Present Illness:  1/19/22, Time: 10:22 AM EST  Chief Complaint   Patient presents with    Nausea    Emesis    Fall    Altered Mental Status      Chivo Leal is a 74 y.o. female presenting to the ED for fall, beginning several days ago. The complaint has been constant, moderate in severity, and worsened by nothing. The patient is a 68-year-old female with a history of hypertension, hyperlipidemia, hypothyroidism, GERD who presents to the emergency department from home via EMS for a fall. The patient symptoms were sudden in onset several days ago, and has been persistent, moderate in severity, nothing makes it better or worse. Apparently the patient was last seen on Sunday and her friend went to check on her today and found her laying on the floor in a pile of vomit. Patient does not remember what happened or how she fell or what led to her laying on the ground. She states that she was nauseated and vomiting for the past few days and has been a lot of time in the bathroom vomiting. She does have a black eye but she states she is unsure how she got that. EMS report that she was a little bit confused at the scene but then alert and oriented in route and remains alert and oriented at this time. The patient denies any anticoagulation use, neck pain, back pain, chest pain, shortness of breath, abdominal pain, lightheadedness, dizziness, blurry vision, double vision, numbness, tingling, recent hospitalization, recent illness, or other acute symptoms or concerns.      Review of Systems:   A complete review of systems was performed and pertinent positives and negatives are stated within HPI, all other systems reviewed and are negative.        --------------------------------------------- PAST HISTORY ---------------------------------------------  Past Medical interpreted by me)  Results for orders placed or performed during the hospital encounter of 01/19/22   CBC auto differential   Result Value Ref Range    WBC 4.9 4.5 - 11.5 E9/L    RBC 4.43 3.50 - 5.50 E12/L    Hemoglobin 14.3 11.5 - 15.5 g/dL    Hematocrit 41.8 34.0 - 48.0 %    MCV 94.4 80.0 - 99.9 fL    MCH 32.3 26.0 - 35.0 pg    MCHC 34.2 32.0 - 34.5 %    RDW 11.8 11.5 - 15.0 fL    Platelets 087 716 - 588 E9/L    MPV 9.1 7.0 - 12.0 fL    Neutrophils % 72.5 43.0 - 80.0 %    Immature Granulocytes % 0.2 0.0 - 5.0 %    Lymphocytes % 16.9 (L) 20.0 - 42.0 %    Monocytes % 10.0 2.0 - 12.0 %    Eosinophils % 0.0 0.0 - 6.0 %    Basophils % 0.4 0.0 - 2.0 %    Neutrophils Absolute 3.56 1.80 - 7.30 E9/L    Immature Granulocytes # 0.01 E9/L    Lymphocytes Absolute 0.83 (L) 1.50 - 4.00 E9/L    Monocytes Absolute 0.49 0.10 - 0.95 E9/L    Eosinophils Absolute 0.00 (L) 0.05 - 0.50 E9/L    Basophils Absolute 0.02 0.00 - 0.20 E9/L   Serum Drug Screen   Result Value Ref Range    Ethanol Lvl <10 mg/dL    Acetaminophen Level <5.0 (L) 10.0 - 70.6 mcg/mL    Salicylate, Serum <1.4 0.0 - 30.0 mg/dL   LACTIC ACID, PLASMA   Result Value Ref Range    Lactic Acid 1.1 0.5 - 2.2 mmol/L   ,       RADIOLOGY:  Interpreted by Radiologist unless otherwise specified  CT HEAD WO CONTRAST   Final Result   Bilateral frontal lobe intraparenchymal hemorrhage left greater than right   with associated subarachnoid blood and epidural blood near the frontal lobes. There is small amount of subarachnoid blood in the left posterior parietal   sulci along with a peripheral focus of hemorrhage in the left cerebellum. Large occipital scalp hematoma is also seen. No associated skull fracture is   identified      RECOMMENDATIONS:   Unavailable         CT CERVICAL SPINE WO CONTRAST   Final Result   1. There is no acute compression fracture or subluxation of the cervical   spine. 2. Advanced multilevel degenerative disc and degenerative joint disease.          CT FACIAL BONES WO CONTRAST   Final Result   No acute traumatic injury of the facial bones. Parenchymal and subdural hemorrhage identified in bilateral frontal lobes. Please refer to separate report CT of the head for further detail. RECOMMENDATIONS:   Unavailable         XR PELVIS (1-2 VIEWS)   Final Result   No acute fracture or dislocation. XR CHEST (2 VW)   Final Result   No acute pulmonary process               EKG Interpretation  Interpreted by emergency department physician, Dr. Celina Hayden    EKG: This EKG is signed and interpreted by me. Rate: 80  Rhythm: Sinus  Interpretation: Normal sinus rhythm with first-degree AV block, normal axis, no acute ST elevations or depressions, intervals are within normal limits, QTC is 470  Comparison: stable as compared to patient's most recent EKG     ------------------------- NURSING NOTES AND VITALS REVIEWED ---------------------------   The nursing notes within the ED encounter and vital signs as below have been reviewed by myself  BP (!) 169/69   Pulse 97   Temp 99.1 °F (37.3 °C) (Oral)   Resp 18   SpO2 98%     Oxygen Saturation Interpretation: Normal    The patients available past medical records and past encounters were reviewed. ------------------------------ ED COURSE/MEDICAL DECISION MAKING----------------------  Medications   0.9 % sodium chloride bolus (1,000 mLs IntraVENous New Bag 1/19/22 1049)   niCARdipine (CARDENE) 50 mg in sodium chloride 0.9 % 250 mL infusion (5 mg/hr IntraVENous New Bag 1/19/22 1105)   levETIRAcetam (KEPPRA) 1000 mg/100 mL IVPB (0 mg IntraVENous Stopped 1/19/22 1114)           The cardiac monitor revealed NSR with a heart rate in the 80s as interpreted by me. The cardiac monitor was ordered secondary to the patient's headache and to monitor the patient for dysrhythmia.    CPT [id number]       I, Dr. Celina Hayden, am the primary provider of record    Medical Decision Making:   The patient is a 49-year-old female who presents to the emergency department complaining of nausea, vomiting, fall, and altered mental status. The patient is hypertensive on arrival with a blood pressure of 201/97, but otherwise hemodynamically stable. There are no focal neurologic deficits on examination. She does have ecchymosis around the right periorbital region but extraocular movements are intact. CT scan does show evidence of significant intracranial hemorrhage. Keppra and Cardene drip were initiated with goal BP of 140. Patient will be transferred to the ED at [facility] for neurosurgery evaluation. Oxygen Saturation Interpretation: 97 % on room air. Re-Evaluations:  ED Course as of 01/19/22 1118   Wed Jan 19, 2022   1111 Dr. Nirali Lovelace, neurosurgery, agreed with plan and would like her transferred to the ED. [KG]   18 Consulted with Dr. Roberto Nixon, ED, who accepted for transfer. [KG]      ED Course User Index  [KG] Brian Palomino DO       Patient was resting comfortably in no acute distress. Head of the bed remained elevated and instructed nurse to keep it at 45 degrees. This patient's ED course included: a personal history and physicial examination, re-evaluation prior to disposition, multiple bedside re-evaluations, IV medications, cardiac monitoring, continuous pulse oximetry and complex medical decision making and emergency management    This patient has remained hemodynamically stable during their ED course. Consultations:  Spoke with Dr. Nirali Lovelace (neurosurgery). Discussed case. They will provide consultation. Spoke with Dr. Roberto Nixon (ED). Discussed case. They accept this patient for transfer. Counseling: The emergency provider has spoken with the patient and discussed todays results, in addition to providing specific details for the plan of care and counseling regarding the diagnosis and prognosis. Questions are answered at this time and they are agreeable with the plan. --------------------------------- IMPRESSION AND DISPOSITION ---------------------------------    IMPRESSION  1. Intraparenchymal hemorrhage of brain ([facility] [address]    2. Fall, initial encounter    3. Altered mental status, unspecified altered mental status type    4. Hematoma    5. Periorbital ecchymosis of right eye, initial encounter    6. Subarachnoid hemorrhage (HCC)    7. Epidural hemorrhage with loss of consciousness, initial encounter ([facility] [address]        DISPOSITION  Disposition: Transfer to [facility]  Patient condition is serious        NOTE: This report was transcribed using voice recognition software. Every effort was made to ensure accuracy; however, inadvertent computerized transcription errors may be present    Please note that the withdrawal or failure to initiate urgent interventions for this patient would likely result in a life threatening deterioration or permanent disability. Accordingly this patient received 40 minutes of critical care time, excluding separately billable procedures.          Valentina Keller DO  01/19/22 1119

## 2022-01-20 LAB
ALBUMIN SERPL-MCNC: 3.7 G/DL (ref 3.5–5.2)
ALP BLD-CCNC: 62 U/L (ref 35–104)
ALT SERPL-CCNC: 18 U/L (ref 0–32)
ANION GAP SERPL CALCULATED.3IONS-SCNC: 15 MMOL/L (ref 7–16)
AST SERPL-CCNC: 25 U/L (ref 0–31)
BILIRUB SERPL-MCNC: 1 MG/DL (ref 0–1.2)
BUN BLDV-MCNC: 8 MG/DL (ref 6–23)
CALCIUM IONIZED: 1.26 MMOL/L (ref 1.15–1.33)
CALCIUM SERPL-MCNC: 9 MG/DL (ref 8.6–10.2)
CHLORIDE BLD-SCNC: 99 MMOL/L (ref 98–107)
CO2: 21 MMOL/L (ref 22–29)
CREAT SERPL-MCNC: 0.4 MG/DL (ref 0.5–1)
GFR AFRICAN AMERICAN: >60
GFR NON-AFRICAN AMERICAN: >60 ML/MIN/1.73
GLUCOSE BLD-MCNC: 83 MG/DL (ref 74–99)
HCT VFR BLD CALC: 37.4 % (ref 34–48)
HEMOGLOBIN: 13 G/DL (ref 11.5–15.5)
MAGNESIUM: 2.1 MG/DL (ref 1.6–2.6)
MCH RBC QN AUTO: 32.5 PG (ref 26–35)
MCHC RBC AUTO-ENTMCNC: 34.8 % (ref 32–34.5)
MCV RBC AUTO: 93.5 FL (ref 80–99.9)
PDW BLD-RTO: 11.9 FL (ref 11.5–15)
PHOSPHORUS: 2.2 MG/DL (ref 2.5–4.5)
PLATELET # BLD: 169 E9/L (ref 130–450)
PMV BLD AUTO: 9.6 FL (ref 7–12)
POTASSIUM REFLEX MAGNESIUM: 3.2 MMOL/L (ref 3.5–5)
RBC # BLD: 4 E12/L (ref 3.5–5.5)
SODIUM BLD-SCNC: 135 MMOL/L (ref 132–146)
TOTAL PROTEIN: 6.5 G/DL (ref 6.4–8.3)
WBC # BLD: 5.1 E9/L (ref 4.5–11.5)

## 2022-01-20 PROCEDURE — 6370000000 HC RX 637 (ALT 250 FOR IP): Performed by: STUDENT IN AN ORGANIZED HEALTH CARE EDUCATION/TRAINING PROGRAM

## 2022-01-20 PROCEDURE — 6360000002 HC RX W HCPCS: Performed by: STUDENT IN AN ORGANIZED HEALTH CARE EDUCATION/TRAINING PROGRAM

## 2022-01-20 PROCEDURE — 99222 1ST HOSP IP/OBS MODERATE 55: CPT

## 2022-01-20 PROCEDURE — 6370000000 HC RX 637 (ALT 250 FOR IP): Performed by: SURGERY

## 2022-01-20 PROCEDURE — 99232 SBSQ HOSP IP/OBS MODERATE 35: CPT | Performed by: NEUROLOGICAL SURGERY

## 2022-01-20 PROCEDURE — 84100 ASSAY OF PHOSPHORUS: CPT

## 2022-01-20 PROCEDURE — 2000000000 HC ICU R&B

## 2022-01-20 PROCEDURE — 83735 ASSAY OF MAGNESIUM: CPT

## 2022-01-20 PROCEDURE — 80053 COMPREHEN METABOLIC PANEL: CPT

## 2022-01-20 PROCEDURE — 85027 COMPLETE CBC AUTOMATED: CPT

## 2022-01-20 PROCEDURE — 82330 ASSAY OF CALCIUM: CPT

## 2022-01-20 PROCEDURE — 36415 COLL VENOUS BLD VENIPUNCTURE: CPT

## 2022-01-20 PROCEDURE — 99291 CRITICAL CARE FIRST HOUR: CPT | Performed by: SURGERY

## 2022-01-20 PROCEDURE — 2580000003 HC RX 258: Performed by: STUDENT IN AN ORGANIZED HEALTH CARE EDUCATION/TRAINING PROGRAM

## 2022-01-20 RX ADMIN — ATORVASTATIN CALCIUM 10 MG: 20 TABLET, FILM COATED ORAL at 08:45

## 2022-01-20 RX ADMIN — ATENOLOL 50 MG: 50 TABLET ORAL at 08:45

## 2022-01-20 RX ADMIN — SODIUM CHLORIDE, PRESERVATIVE FREE 10 ML: 5 INJECTION INTRAVENOUS at 08:51

## 2022-01-20 RX ADMIN — POLYETHYLENE GLYCOL 3350 17 G: 17 POWDER, FOR SOLUTION ORAL at 08:46

## 2022-01-20 RX ADMIN — PANTOPRAZOLE SODIUM 40 MG: 40 TABLET, DELAYED RELEASE ORAL at 06:05

## 2022-01-20 RX ADMIN — HYDRALAZINE HYDROCHLORIDE 10 MG: 20 INJECTION INTRAMUSCULAR; INTRAVENOUS at 12:11

## 2022-01-20 RX ADMIN — LEVETIRACETAM 500 MG: 5 INJECTION INTRAVENOUS at 19:47

## 2022-01-20 RX ADMIN — Medication 250 MG: at 21:20

## 2022-01-20 RX ADMIN — HYDRALAZINE HYDROCHLORIDE 10 MG: 20 INJECTION INTRAMUSCULAR; INTRAVENOUS at 10:07

## 2022-01-20 RX ADMIN — SODIUM CHLORIDE, PRESERVATIVE FREE 10 ML: 5 INJECTION INTRAVENOUS at 21:21

## 2022-01-20 RX ADMIN — LEVETIRACETAM 500 MG: 5 INJECTION INTRAVENOUS at 08:29

## 2022-01-20 RX ADMIN — ACETAMINOPHEN 1000 MG: 500 TABLET ORAL at 14:04

## 2022-01-20 RX ADMIN — SODIUM CHLORIDE: 9 INJECTION, SOLUTION INTRAVENOUS at 08:17

## 2022-01-20 RX ADMIN — Medication 250 MG: at 17:03

## 2022-01-20 RX ADMIN — ACETAMINOPHEN 1000 MG: 500 TABLET ORAL at 06:05

## 2022-01-20 RX ADMIN — HYDRALAZINE HYDROCHLORIDE 10 MG: 20 INJECTION INTRAMUSCULAR; INTRAVENOUS at 18:13

## 2022-01-20 RX ADMIN — ACETAMINOPHEN 1000 MG: 500 TABLET ORAL at 21:20

## 2022-01-20 RX ADMIN — POTASSIUM BICARBONATE 40 MEQ: 782 TABLET, EFFERVESCENT ORAL at 14:04

## 2022-01-20 RX ADMIN — LEVOTHYROXINE SODIUM 25 MCG: 25 TABLET ORAL at 06:05

## 2022-01-20 ASSESSMENT — PAIN SCALES - GENERAL
PAINLEVEL_OUTOF10: 0

## 2022-01-20 NOTE — CARE COORDINATION
Met with pt at bedside, explained my role. She is alert, oriented,pleasant, speech clear. Pt states she lives alone in a 1 story home with 2 steps and a threshold to enter. She is independent in adl's. No hx of ngozi or hhc. Verified Pcp and pharmacy. When medically appropriate, will need PT/Ot evals to determine if any rehab needs.

## 2022-01-20 NOTE — PROGRESS NOTES
[facility]  SURGICAL INTENSIVE CARE UNIT (SICU)  ATTENDING PHYSICIAN CRITICAL CARE PROGRESS NOTE     I have examined the patient, reviewed the record, and discussed the case with the APN/ resident. Please refer to the APN/ resident's note. I agree with the assessment and plan. I have reviewed all relevant labs and imaging data. The following summarizes my clinical findings and independent assessment. CC:  Critical care management after found down    Hospital Course/Overnight Events:  1/19--unclear story--was vomiting blood per pt; next thing she remembers she was brought to hospital by niece; found to have [facility]; admitted to SICU  1/20--no new issues overnight; weaned off of Cardene    Pt without complaints. Denies headache; denies nausea or vomiting; denies numbness/tingling; denies SOB or difficulty breathing; denies abd pain.     Asleep but arousable  Follows commands  PERRL; moist mucous membranes  Hrt:  Regular rate/rhythm; no murmur  Lungs:  Fairly clear bilaterally  Abd:  Soft; BS active; NT/ND  Skin:  Warm/dry; periorbital ecchymosis  Ext:  Strength 5/5 bilateral upper/lower ext; no sensorimotor deficits    Patient Active Problem List    Diagnosis Date Noted    SAH (subarachnoid hemorrhage) ([facility] [address] 01/19/2022    Intracranial hemorrhage (HCC)     Elevated liver function tests 06/23/2020    Essential hypertension 03/03/2020    Mixed hyperlipidemia 03/03/2020    Acquired hypothyroidism 03/03/2020    Gastroesophageal reflux disease without esophagitis 03/03/2020    Neuropathy of both feet 03/03/2020       SAH/ICH--monitor neuro exam; keppra for seizure prophylaxis  Reported hematemesis--cont PPI; likely will need EGD, but can be done as outpt  Electrolyte imbalance (hypokalemia/hypophosphatemia)--correct as able  HTN--weaned off of Cardene; cont atenolol  Hypothyroidism--cont synthroid  Check swallow eval and start PO as able  Hx of EtOH use--monitor for withdrawal symptoms  PT/OT evals  DVT risk--PCDs    Pt is at risk for neurologic/hemodynamic/metabolic deterioration and requires ICU care    Shad Hughes MD, [facility]  1/20/2022  11:33 AM      Critical care time exclusive of teaching and procedures = 36 minutes

## 2022-01-20 NOTE — PROGRESS NOTES
Department of Neurosurgery  Progress Note    CHIEF COMPLAINT: ICH seen on CT    SUBJECTIVE:  No acute events overnight. Denies any HA. No new complaints. MRI Brain W/WO contrast reviewed. REVIEW OF SYSTEMS :  Constitutional: Negative for chills and fever. Neurological: Negative for dizziness, tremors and speech change.      OBJECTIVE:   VITALS:  /70   Pulse 65   Temp 98.7 °F (37.1 °C) (Axillary)   Resp 12   Ht 5' 1\" (1.549 m)   Wt 111 lb 5.3 oz (50.5 kg)   LMP  (LMP Unknown)   SpO2 99%   BMI 21.04 kg/m²     PHYSICAL:  Alert, oriented x3  Appears stated age  PERRL  EOMI  MARTI  Sensation intact to light touch  (-) drift    DATA:  CBC:   Lab Results   Component Value Date    WBC 4.9 01/19/2022    RBC 4.43 01/19/2022    HGB 14.3 01/19/2022    HCT 41.8 01/19/2022    MCV 94.4 01/19/2022    MCH 32.3 01/19/2022    MCHC 34.2 01/19/2022    RDW 11.8 01/19/2022     01/19/2022    MPV 9.1 01/19/2022     BMP:    Lab Results   Component Value Date     01/20/2022    K 3.2 01/20/2022    CL 99 01/20/2022    CO2 21 01/20/2022    BUN 8 01/20/2022    LABALBU 3.7 01/20/2022    CREATININE 0.4 01/20/2022    CALCIUM 9.0 01/20/2022    GFRAA >60 01/20/2022    LABGLOM >60 01/20/2022    GLUCOSE 83 01/20/2022     PT/INR:    Lab Results   Component Value Date    PROTIME 11.3 01/19/2022    INR 1.0 01/19/2022     PTT:    Lab Results   Component Value Date    APTT 27.2 01/19/2022   [APTT}    Current Inpatient Medications  Current Facility-Administered Medications: niCARdipine (CARDENE) 50 mg in sodium chloride 0.9 % 250 mL infusion, 3-15 mg/hr, IntraVENous, Continuous  levETIRAcetam (KEPPRA) 500 mg/100 mL IVPB, 500 mg, IntraVENous, Q12H  atorvastatin (LIPITOR) tablet 10 mg, 10 mg, Oral, Daily  levothyroxine (SYNTHROID) tablet 25 mcg, 25 mcg, Oral, Daily  pantoprazole (PROTONIX) tablet 40 mg, 40 mg, Oral, QAM AC  sodium chloride flush 0.9 % injection 5-40 mL, 5-40 mL, IntraVENous, 2 times per day  sodium chloride flush 0.9 % injection 5-40 mL, 5-40 mL, IntraVENous, PRN  0.9 % sodium chloride infusion, 25 mL, IntraVENous, PRN  ondansetron (ZOFRAN-ODT) disintegrating tablet 4 mg, 4 mg, Oral, Q8H PRN **OR** ondansetron (ZOFRAN) injection 4 mg, 4 mg, IntraVENous, Q6H PRN  polyethylene glycol (GLYCOLAX) packet 17 g, 17 g, Oral, Daily  bisacodyl (DULCOLAX) EC tablet 5 mg, 5 mg, Oral, Daily PRN  labetalol (NORMODYNE;TRANDATE) injection 10 mg, 10 mg, IntraVENous, Q2H PRN  hydrALAZINE (APRESOLINE) injection 10 mg, 10 mg, IntraVENous, Q2H PRN  acetaminophen (TYLENOL) tablet 1,000 mg, 1,000 mg, Oral, 3 times per day  0.9 % sodium chloride infusion, , IntraVENous, Continuous  atenolol (TENORMIN) tablet 50 mg, 50 mg, Oral, Daily    ASSESSMENT:   -Kashif Alarcon is a 75 y/o female who CT scan showed ICH. MRI stable. PLAN:  -Continue current care  -Keep SBP <140  -serial neuro checks  -No AP/AC medications  -Follow up in clinic in 1 month with repeat MRI Brain W/WO Contrast  -Please call with any questions or concerns. Electronically signed by Xander Mckeon PA-C on 1/20/2022 at 8:02 AM     Nsx Attending:    Patient was seen and examined by me with the team.  I personally reviewed all pertinent radiological images. I concur with Miss Song's clinical assessment and plan. No ill events ON. Denies HA. Neurologically non-focal for me on exam as above. MRI fails to show enhancing underlying mass. Plan as above. Thank you so much for allowing us to participate in the care of this patient. I certify that I spent more than half of the total time on this encounter. NOTE: This report was transcribed using voice recognition software.  Every effort was made to ensure accuracy; however, inadvertent computerized transcription errors may be present

## 2022-01-20 NOTE — CONSULTS
Palliative Care Department  [phone]  Palliative Care Initial Consult  Provider FORREST Chang CNP      PATIENT: Charles Lai  : 1955  MRN: [id number]  ADMISSION DATE: 2022 12:46 PM  Referring Shelley Alaniz MD    Palliative Medicine was consulted on hospital day 1 for assistance with Other: >65 trauma patient admitted to ICU   HPI:     Latoya Lima is a 74 y.o. y/o female with a history of hypertension, hyperlipidemia, hypothyroidism, GERD, who presented to [facility]) on 2022 with subarachnoid hemorrhage. The patient was found laying on the ground on the pile of vomit. She reported that she has been feeling nauseous and vomiting for a few days prior to the incident. CAT scan showed evidence of significant intracranial hemorrhage. Patient currently on Keppra prophylactically. An MRI of the brain stable hemorrhagic contusion,unchanged subarachnoid hemorrhage, and unchanged bilateral acute on chronic subdural hematomas. Palliative medicine consulted to discuss goal of care for a Dalila trauma patient. ASSESSMENT/PLAN:     Pertinent Hospital Diagnoses      Subarachnoid hemorrhage   Fall   Nausea and vomiting   Altered mental status      Palliative Care Encounter / Counseling Regarding Goals of Care  Please see detailed goals of care discussion as below   At this time, Charles Lai, Does have capacity for medical decision-making. Capacity is time limited and situation/question specific   Outcome of goals of care meeting:   Code status Limited no intubation   Advanced Directives: no POA or living will in epic   Surrogate/Legal NOK:  carlito Sousa Artist (child) [phone]      Spiritual assessment: no spiritual distress identified  Bereavement and grief: to be determined  Referrals to: none today    Thank you for the opportunity to participate in the care of Charles Lai.      Palliative Medicine     SUBJECTIVE:     Details of Conversation:   Chart reviewed. Got updated from bedside nurse. Met with patient at the bedside. No family present. Patient alert and oriented and able to make decisions for herself. Introduced palliative medicine. We discussed CODE STATUS and goal of care. Cara Medeiros expressed her desires of not having to be on vent support. She does express her wishes to have efforts done to her in a cardiac arrest, she is agreeable to CPR, defibrillator cardioversion, and resuscitative medications provided. CODE STATUS was changed to limited. Comfort support were provided. Goal of care and CODE STATUS has been established. No further PMs needs were identified. We will sign off for now. Please reconsult as if new PMs need arises. Prognosis: Guarded    OBJECTIVE:     /70   Pulse 65   Temp 98.7 °F (37.1 °C) (Axillary)   Resp 12   Ht 5' 1\" (1.549 m)   Wt 111 lb 5.3 oz (50.5 kg)   LMP  (LMP Unknown)   SpO2 99%   BMI 21.04 kg/m²     Physical Examination:  Gen:  awake, alert   HEENT: normocephalic, atraumatic, PERRL, EOMI,   Neck: trachea midline, no JVD  Lungs: respirations easy and not labored,   Heart: regular rate and rhythm, distant heart tones,   Abdomen: normoactive bowel sounds, soft, non-tender  Extremities: no clubbing, cyanosis or edema, moving all extremities    Skin: Right eye bruise  Neuro: awake, alert, oriented x 3, follows commands, no gross neurologic deficit    Objective data reviewed: labs, images, records, medication use, vitals and chart    Time/Communication  Greater than 50% of time spent, total 50 minutes in counseling and coordination of care at the bedside regarding Dalila trauma  and goals of care. Thank you for allowing Palliative Medicine to participate in the care of [address] Note: This report was completed using [employer] voiced recognition software.   Every effort has been made to ensure accuracy; however, inadvertent computerized transcription errors may be present.

## 2022-01-20 NOTE — PROGRESS NOTES
TRAUMA TERTIARY    Admit Date: 1/19/2022    [facility]    CC:    Chief Complaint   Patient presents with   Peralta Fall     tx from [facility], fall @ home and found on floor. pt with BL frontal lobe hemorrhage. on cardeine gtt @ 2.5/hr       Alcohol pre-screening:  How many times in the past year have you had 4-5 or more drinks in a day?  none    Subjective:       No neuro signs. No new complaints. PERRL. AKT18. MAEx4 with 5/5 strength       Objective:     Patient Vitals for the past 8 hrs:   BP Temp Temp src Pulse Resp SpO2   01/20/22 1800 (!) 164/77 -- -- 54 14 98 %   01/20/22 1700 (!) 152/67 -- -- 54 13 98 %   01/20/22 1600 (!) 156/74 97.8 °F (36.6 °C) Oral 60 19 98 %   01/20/22 1500 135/64 -- -- 64 17 97 %   01/20/22 1430 137/72 -- -- 66 20 99 %   01/20/22 1400 137/65 97.8 °F (36.6 °C) Oral 54 17 98 %   01/20/22 1300 129/60 -- -- 56 15 98 %   01/20/22 1200 (!) 149/75 97.7 °F (36.5 °C) Oral 54 16 98 %   01/20/22 1130 124/67 -- -- 56 16 98 %   01/20/22 1100 (!) 146/67 -- -- 54 12 99 %   01/20/22 1030 (!) 131/59 -- -- 61 14 98 %       I/O last 3 completed shifts: In: 1302 [I.V.:1202; IV Piggyback:100]  Out: 850 [Urine:850]  I/O this shift:  In: 1012 [P.O.:300; I.V.:712]  Out: -     Radiology:  MRI BRAIN W WO CONTRAST   Final Result      CT HEAD WO CONTRAST   Final Result   1. Stable hemorrhagic contusions in the bilateral frontal lobes and left   cerebellar hemisphere. 2. Stable subarachnoid hemorrhage in the bilateral cerebral hemispheres and   left cerebellar hemisphere. 3. Stable bifrontal subdural hemorrhage as well as subdural hemorrhage along   the left tentorium. 4. Chronic small vessel ischemic disease. CT ABDOMEN PELVIS W IV CONTRAST Additional Contrast? None   Final Result   Compression deformity of T11 with 30% loss of height of unknown age. No   other acute traumatic injury is identified in the chest.  There is no focal   infiltrates.       There is no acute inflammation, acute traumatic injury or bowel [facility])  Resolved Problems:    * No resolved hospital problems. *        Assessment/Plan:     Neuro:  No acute issues. GCS 15. Pain control. q1 hr neuro checks. Keppra. NSG consulted - NOM  CV: No acute issues. Pulm: No acute issues. Encourage IS/SMI. GI: No acute issues. Bowel regimen. Zofran PRN. Renal: No acute issues. Endocrine: No acute issues. MSK: No acute issues. PT/OT. Heme: No acute issues. ID: No acute issues. Pain/Analgesia: tylenol  Bowel Regimen: glycolax  DVT PPx:  SCDs, hold to to head bleed  GI PPx:  n/a     Code status:  Limited    Disposition:  Continue SICU.      Nilam French MD  Resident, PGY-3  1/20/2022  6:26 PM

## 2022-01-20 NOTE — PROGRESS NOTES
Dr Brit Lozano notified of admission    Electronically signed by FORREST Mustafa CNP on 1/20/2022 at 3:00 PM

## 2022-01-21 VITALS
HEART RATE: 70 BPM | HEIGHT: 61 IN | TEMPERATURE: 98.2 F | SYSTOLIC BLOOD PRESSURE: 103 MMHG | BODY MASS INDEX: 21.02 KG/M2 | OXYGEN SATURATION: 98 % | WEIGHT: 111.33 LBS | DIASTOLIC BLOOD PRESSURE: 53 MMHG | RESPIRATION RATE: 16 BRPM

## 2022-01-21 DIAGNOSIS — I61.0 NONTRAUMATIC SUBCORTICAL HEMORRHAGE OF CEREBRAL HEMISPHERE, UNSPECIFIED LATERALITY (HCC): Primary | ICD-10-CM

## 2022-01-21 LAB
CALCIUM IONIZED: 1.38 MMOL/L (ref 1.15–1.33)
HCT VFR BLD CALC: 41.5 % (ref 34–48)
HEMOGLOBIN: 13.9 G/DL (ref 11.5–15.5)
MAGNESIUM: 2.1 MG/DL (ref 1.6–2.6)
MCH RBC QN AUTO: 32.6 PG (ref 26–35)
MCHC RBC AUTO-ENTMCNC: 33.5 % (ref 32–34.5)
MCV RBC AUTO: 97.2 FL (ref 80–99.9)
MRSA CULTURE ONLY: NORMAL
PDW BLD-RTO: 12 FL (ref 11.5–15)
PHOSPHORUS: 2.8 MG/DL (ref 2.5–4.5)
PLATELET # BLD: 221 E9/L (ref 130–450)
PMV BLD AUTO: 9.1 FL (ref 7–12)
RBC # BLD: 4.27 E12/L (ref 3.5–5.5)
WBC # BLD: 7.4 E9/L (ref 4.5–11.5)

## 2022-01-21 PROCEDURE — 85027 COMPLETE CBC AUTOMATED: CPT

## 2022-01-21 PROCEDURE — 97165 OT EVAL LOW COMPLEX 30 MIN: CPT

## 2022-01-21 PROCEDURE — 99232 SBSQ HOSP IP/OBS MODERATE 35: CPT | Performed by: NEUROLOGICAL SURGERY

## 2022-01-21 PROCEDURE — 6360000002 HC RX W HCPCS: Performed by: STUDENT IN AN ORGANIZED HEALTH CARE EDUCATION/TRAINING PROGRAM

## 2022-01-21 PROCEDURE — 97530 THERAPEUTIC ACTIVITIES: CPT

## 2022-01-21 PROCEDURE — 6370000000 HC RX 637 (ALT 250 FOR IP): Performed by: STUDENT IN AN ORGANIZED HEALTH CARE EDUCATION/TRAINING PROGRAM

## 2022-01-21 PROCEDURE — 6370000000 HC RX 637 (ALT 250 FOR IP): Performed by: SURGERY

## 2022-01-21 PROCEDURE — 82330 ASSAY OF CALCIUM: CPT

## 2022-01-21 PROCEDURE — 36415 COLL VENOUS BLD VENIPUNCTURE: CPT

## 2022-01-21 PROCEDURE — 83735 ASSAY OF MAGNESIUM: CPT

## 2022-01-21 PROCEDURE — 84100 ASSAY OF PHOSPHORUS: CPT

## 2022-01-21 PROCEDURE — 6370000000 HC RX 637 (ALT 250 FOR IP): Performed by: NURSE PRACTITIONER

## 2022-01-21 PROCEDURE — 97161 PT EVAL LOW COMPLEX 20 MIN: CPT

## 2022-01-21 PROCEDURE — 2580000003 HC RX 258: Performed by: STUDENT IN AN ORGANIZED HEALTH CARE EDUCATION/TRAINING PROGRAM

## 2022-01-21 RX ORDER — POLYETHYLENE GLYCOL 3350 17 G/17G
17 POWDER, FOR SOLUTION ORAL DAILY
Qty: 527 G | Refills: 1 | Status: SHIPPED | OUTPATIENT
Start: 2022-01-22 | End: 2022-02-01

## 2022-01-21 RX ORDER — PHENOBARBITAL 32.4 MG/1
64.8 TABLET ORAL EVERY 6 HOURS
Status: DISCONTINUED | OUTPATIENT
Start: 2022-01-21 | End: 2022-01-22 | Stop reason: HOSPADM

## 2022-01-21 RX ORDER — LEVETIRACETAM 500 MG/1
500 TABLET ORAL 2 TIMES DAILY
Qty: 9 TABLET | Refills: 0 | Status: SHIPPED | OUTPATIENT
Start: 2022-01-21 | End: 2022-02-21

## 2022-01-21 RX ORDER — LEVETIRACETAM 500 MG/1
500 TABLET ORAL 2 TIMES DAILY
Status: DISCONTINUED | OUTPATIENT
Start: 2022-01-21 | End: 2022-01-22 | Stop reason: HOSPADM

## 2022-01-21 RX ADMIN — SODIUM CHLORIDE, PRESERVATIVE FREE 10 ML: 5 INJECTION INTRAVENOUS at 09:25

## 2022-01-21 RX ADMIN — LEVETIRACETAM 500 MG: 500 TABLET, FILM COATED ORAL at 09:25

## 2022-01-21 RX ADMIN — ACETAMINOPHEN 1000 MG: 500 TABLET ORAL at 05:31

## 2022-01-21 RX ADMIN — ATENOLOL 50 MG: 50 TABLET ORAL at 09:25

## 2022-01-21 RX ADMIN — HYDRALAZINE HYDROCHLORIDE 10 MG: 20 INJECTION INTRAMUSCULAR; INTRAVENOUS at 03:38

## 2022-01-21 RX ADMIN — ATORVASTATIN CALCIUM 10 MG: 20 TABLET, FILM COATED ORAL at 09:25

## 2022-01-21 RX ADMIN — POLYETHYLENE GLYCOL 3350 17 G: 17 POWDER, FOR SOLUTION ORAL at 09:25

## 2022-01-21 RX ADMIN — PHENOBARBITAL 64.8 MG: 32.4 TABLET ORAL at 17:39

## 2022-01-21 RX ADMIN — Medication 250 MG: at 09:25

## 2022-01-21 RX ADMIN — PHENOBARBITAL 64.8 MG: 32.4 TABLET ORAL at 11:39

## 2022-01-21 RX ADMIN — LEVOTHYROXINE SODIUM 25 MCG: 25 TABLET ORAL at 05:31

## 2022-01-21 RX ADMIN — Medication 250 MG: at 14:26

## 2022-01-21 RX ADMIN — PANTOPRAZOLE SODIUM 40 MG: 40 TABLET, DELAYED RELEASE ORAL at 05:31

## 2022-01-21 ASSESSMENT — PAIN SCALES - GENERAL
PAINLEVEL_OUTOF10: 0

## 2022-01-21 NOTE — PLAN OF CARE
Problem: Skin Integrity - Impaired:  Goal: Will show no infection signs and symptoms  Description: Will show no infection signs and symptoms  Outcome: Met This Shift     Problem: Skin Integrity - Impaired:  Goal: Absence of new skin breakdown  Description: Absence of new skin breakdown  Outcome: Met This Shift

## 2022-01-21 NOTE — PROGRESS NOTES
Department of Neurosurgery  Progress Note    CHIEF COMPLAINT: ICH    SUBJECTIVE:  Patient states she is doing well. Denies any HA. No acute events overnight. REVIEW OF SYSTEMS :  Constitutional: Negative for chills and fever. Neurological: Negative for dizziness, tremors and speech change.      OBJECTIVE:   VITALS:  /71   Pulse 61   Temp 97.6 °F (36.4 °C) (Temporal)   Resp 16   Ht 5' 1\" (1.549 m)   Wt 111 lb 5.3 oz (50.5 kg)   LMP  (LMP Unknown)   SpO2 99%   BMI 21.04 kg/m²     PHYSICAL:  Alert, oriented x3  Appears stated age  PERRL  EOMI  MARTI  Sensation intact to light touch  (-) drift    DATA:  CBC:   Lab Results   Component Value Date    WBC 7.4 01/21/2022    RBC 4.27 01/21/2022    HGB 13.9 01/21/2022    HCT 41.5 01/21/2022    MCV 97.2 01/21/2022    MCH 32.6 01/21/2022    MCHC 33.5 01/21/2022    RDW 12.0 01/21/2022     01/21/2022    MPV 9.1 01/21/2022     BMP:    Lab Results   Component Value Date     01/20/2022    K 3.2 01/20/2022    CL 99 01/20/2022    CO2 21 01/20/2022    BUN 8 01/20/2022    LABALBU 3.7 01/20/2022    CREATININE 0.4 01/20/2022    CALCIUM 9.0 01/20/2022    GFRAA >60 01/20/2022    LABGLOM >60 01/20/2022    GLUCOSE 83 01/20/2022     PT/INR:    Lab Results   Component Value Date    PROTIME 11.3 01/19/2022    INR 1.0 01/19/2022     PTT:    Lab Results   Component Value Date    APTT 27.2 01/19/2022   [APTT}    Current Inpatient Medications  Current Facility-Administered Medications: benzocaine-menthol (CEPACOL SORE THROAT) lozenge 1 lozenge, 1 lozenge, Buccal, PRN  levETIRAcetam (KEPPRA) tablet 500 mg, 500 mg, Oral, BID  potassium & sodium phosphates (PHOS-NAK) 280-160-250 MG packet 250 mg, 1 packet, Oral, 4x Daily  atorvastatin (LIPITOR) tablet 10 mg, 10 mg, Oral, Daily  levothyroxine (SYNTHROID) tablet 25 mcg, 25 mcg, Oral, Daily  pantoprazole (PROTONIX) tablet 40 mg, 40 mg, Oral, QAM AC  sodium chloride flush 0.9 % injection 5-40 mL, 5-40 mL, IntraVENous, 2 times per day  sodium chloride flush 0.9 % injection 5-40 mL, 5-40 mL, IntraVENous, PRN  0.9 % sodium chloride infusion, 25 mL, IntraVENous, PRN  ondansetron (ZOFRAN-ODT) disintegrating tablet 4 mg, 4 mg, Oral, Q8H PRN **OR** ondansetron (ZOFRAN) injection 4 mg, 4 mg, IntraVENous, Q6H PRN  polyethylene glycol (GLYCOLAX) packet 17 g, 17 g, Oral, Daily  bisacodyl (DULCOLAX) EC tablet 5 mg, 5 mg, Oral, Daily PRN  labetalol (NORMODYNE;TRANDATE) injection 10 mg, 10 mg, IntraVENous, Q2H PRN  hydrALAZINE (APRESOLINE) injection 10 mg, 10 mg, IntraVENous, Q2H PRN  acetaminophen (TYLENOL) tablet 1,000 mg, 1,000 mg, Oral, 3 times per day  atenolol (TENORMIN) tablet 50 mg, 50 mg, Oral, Daily    ASSESSMENT:   -Manuel Landrum is a 75 y/o female who CT scan showed ICH. MRI stable. PLAN:  -Continue current care  -Keep SBP <140  -serial neuro checks  -No AP/AC medications  -Follow up in clinic in 1 month with repeat MRI Brain W/WO Contrast  -Please call with any questions or concerns. Electronically signed by Celio Valdez PA-C on 1/21/2022 at 8:33 AM     Nsx Attending:    Patient was seen and examined by me with the team.  I personally reviewed all pertinent radiological images. I concur with Miss Song's clinical assessment and plan. Neurologically non-focal for me on exam.  Plan as above. Thank you so much for allowing us to participate in the care of this patient. I certify that I spent more than half of the total time on this encounter. NOTE: This report was transcribed using voice recognition software.  Every effort was made to ensure accuracy; however, inadvertent computerized transcription errors may be present

## 2022-01-21 NOTE — PROGRESS NOTES
Physical Therapy    Initial Assessment     Name: Candido Jackson  : 1955  MRN: [id number]      Date of Service: 2022    Evaluating PT: Cinda Cross PT, DPMERVAT [id number]      Room #:  7826/6114-V  Diagnosis:  Intracranial hemorrhage ([facility] [place] [address] [I62.9]  SAH (subarachnoid hemorrhage) ([facility] [place] [address] [I60.9]  Precautions:  Fall risk, ICH    SUBJECTIVE:    Pt lives alone in a 1 story house with 1 stair(s) and 0 rail(s) to enter. Bed and bathroom on 1st floor. Pt ambulated with no AD prior to admission. OBJECTIVE:   Initial Evaluation  Date: 22 Treatment Date: Short Term/ Long Term   Goals   AM-PAC 6 Clicks 40/     Was pt agreeable to Eval/treatment? Yes     Does pt have pain? 2/10 headache     Bed Mobility  Rolling: NT  Supine to sit: SBA  Sit to supine: SBA  Scooting: SBA   Rolling: Independent  Supine to sit: Independent  Sit to supine: Independent  Scooting: Independent   Transfers Sit to stand: SBA  Stand to sit: SBA  Stand pivot: SBA  Sit to stand: Independent    Stand to sit: Independent   Stand pivot: Supervision   Ambulation   75, 150 feet with SBA without AD  75 feet with SBA with [employer]  >400 feet with Supervision with [employer]   Stair negotiation: ascended and descended 5 step(s) with 1 rail(s) with SBA  >15 step(s) with 1 rail(s) with Supervision   ROM BUE: Refer to OT note  BLE: WFL     Strength BUE: Refer to OT note   BLE: 4+/5     Balance Sitting EOB: SBA  Dynamic Standing: SBA with [employer]  Sitting EOB: Independent   Dynamic Standing: Supervision with [employer]     Pt is A & O x: x 3-4 grossly to person, place, and month/year. Sensation: Pt denies numbness and tingling in extremities.    Edema: NT    Patient education  Pt educated on decreasing gait speed when ambulating with [employer].     Patient response to education:   Pt verbalized understanding Pt demonstrated skill Pt requires further education in this area   Yes Yes Reinforce     ASSESSMENT:    Conditions Requiring Skilled Therapeutic Intervention:    [x]Decreased strength     []Decreased ROM  [x]Decreased functional mobility  [x]Decreased balance   [x]Decreased endurance   [x]Decreased posture  []Decreased sensation  []Decreased coordination   []Decreased vision  [x]Decreased safety awareness   [x]Increased pain       Comments:    Pt was in bed upon entry and agreeable to PT evaluation. Pt is a good historian and follows simple commands. Pt sat to EOB with no complaints of dizziness. Pt stood and verbalized exacerbation of generalized back pain immediately after stance. Symptoms improved as session continued. Pt ambulated to hallway. Gait was slow and unsteady with small steps without AD. Pt had improved gait speed, deya and step length with a [facility]. Pt is apprehensive when ambulating without a [facility] and feels unsteady but prefers to ambulate without device. Pt used reciprocal step pattern when ascending stairs. Pt was unsteady so pt was instructed to use nonreciprocal step when descending. Pt ambulated back to room. This PT recommends use of WW once discharged home, however pt will most likely refuse. At the very least, pt is recommended to have 24/7 supervision and assist. Pt reports brother can stay with her. Pt concerns and needs were addressed. Pt was left in chair with call light within reach. RN notified on pt's status. Treatment:  Patient practiced and was instructed in the following treatment:     Transfers: Pt was cued for proper hand placement when transferring from sit <> stand.  Ambulation: Pt was cued for maintaining safe gait speed with ambulation and WW recommendation for safety and steadiness.  Stairs: Pt was cued for non-reciprocal step pattern in stairs for safety and steadiness. Pt's/family goals:  1. Discharge to home    Prognosis is Fair for reaching above PT goals. Patient and or family understand(s) diagnosis, prognosis, and plan of care. Yes.      PHYSICAL THERAPY PLAN OF CARE:    PT POC is established based on physician order and patient diagnosis     Referring provider/PT Order:    Start   Ordering Provider    01/21/22 0730  PT eval and treat  Start:  01/21/22 0730,   End:  01/21/22 0730,   ONE TIME,   Standing Count:  1 Occurrences,   [address], APRN - [facility] [place]        Diagnosis:  Intracranial hemorrhage ([facility] [address] [I62.9]  SAH (subarachnoid hemorrhage) ([facility] [address] [I60.9]  Specific instructions for next treatment:  Increase ambulation distance. Current Treatment Recommendations:     [x] Strengthening to improve independence with functional mobility   [x] ROM to improve independence with functional mobility   [x] Balance Training to improve static/dynamic balance and to reduce fall risk  [x] Endurance Training to improve activity tolerance during functional mobility   [x] Transfer Training to improve safety and independence with all functional transfers   [x] Gait Training to improve gait mechanics, endurance and assess need for appropriate assistive device  [x] Stair Training in preparation for safe discharge home and/or into the community   [x] Positioning to prevent skin breakdown and contractures  [x] Safety and Education Training   [x] Patient/Caregiver Education   [] HEP  [] Other     PT long term treatment goals are located in above grid    Frequency of treatments: 2-5x/week x 2-3 days. Time in  1050  Time out  1110    Total Treatment Time  10 minutes     Evaluation Time includes thorough review of current medical information, gathering information on past medical history/social history and prior level of function, completion of standardized testing/informal observation of tasks, assessment of data and education on plan of care and goals.     CPT codes:  [x] Low Complexity PT evaluation 92331  [] Moderate Complexity PT evaluation 03614  [] High Complexity PT evaluation 72613  [] PT Re-evaluation 62876  [] Gait training 21842 0 minutes  [] Manual therapy 97303 0 minutes  [x] Therapeutic activities 23383 10 minutes  [] Therapeutic exercises 27111 0 minutes  [] Neuromuscular reeducation 86670 0 minutes     Payam Owens, PT, DPT  [id number]    Azael Braun, SPT

## 2022-01-21 NOTE — PROGRESS NOTES
[address]       Date:2022                                                  Patient Name: Lyndsay Love  MRN: [id number]  : 1955  Room: [address] [place] [address]    Evaluating OT: Merry Hewitt, OTR/L #[id number]    Referring Provider[de-identified] FORREST Vaz CNP     Specific Provider Orders/Date: OT evaluation and treatment 22    Diagnosis:  Intracranial hemorrhage ([facility] [address] [I62.9]  SAH (subarachnoid hemorrhage) ([facility] [address] [I60.9]      Pertinent Medical History:  has no past medical history on file.        Precautions:  Fall Risk,     Assessment of current deficits   [x] Functional mobility   [x]ADLs  [x] Strength               []Cognition   [x] Functional transfers   [] IADLs         [x] Safety Awareness   [x]Endurance   [] Fine Coordination              [x] Balance      [] Vision/perception   []Sensation    []Gross Motor Coordination  [] ROM  [] Delirium                   [] Motor Control     OT PLAN OF CARE   OT POC based on physician orders, patient diagnosis and results of clinical assessment    Frequency/Duration 1-3 days/wk for 2 weeks PRN   Specific OT Treatment to include:   * Instruction/training on adapted ADL techniques and AE recommendations to increase functional independence within precautions       * Functional transfer/mobility training/DME recommendations for increased independence, safety, and fall prevention  * Patient/Family education to increase follow through with safety techniques and functional independence  * Therapeutic activities to facilitate/challenge dynamic balance, stand tolerance for increased safety and independence with ADLs    Recommended Adaptive Equipment: shower chair for safety- TBD     Home Living:  Pt lives alone in a 1 story with 2 step(s) to enter and 0 rail(s); bed/bath on 1st   Bathroom setup: tub shower combo,    Equipment owned: none    Prior Level of Function: Independent  with ADLs , Independent  with IADLs; ambulated with no AD  Driving: yes  Occupation/leisure: not reported    Pain Level: 2/10 , headache    Cognition: A&O: 4/4;   Follows 2 step directions: good    Memory:  good    Sequencing:  good    Problem solving:  fair    Judgement/safety:  fair     Functional Assessment:   AM-PAC Daily Activity Raw Score: 19/24     Initial Eval Status  Date: 1/21/22 Treatment Status  Date: STG=LTG  Time frame: 10-14 days   Feeding Independent   Independent    Grooming Stand by Assist   To groom hair standing at sink  Independent    UB Dressing Supervision   Independent    LB Dressing Stand by Assist   Independent    Bathing Stand by Assist  Independent    Toileting Stand by Assist   Independent    Bed Mobility  Supine to sit: Independent   Sit to supine: Independent   Supine to sit: Independent   Sit to supine: Independent    Functional Transfers Sit to stand: Stand by Assist   Stand to sit: Stand by Assist   Stand pivot: Stand by Assist   Independent    Functional Mobility SBA with no AD  Short distance in room, steady with no AD  indep   Balance Sitting: indep     Standing: sba  Sitting: indep      Standing: indep   Activity Tolerance fair  good   Visual/  Perceptual Glasses: reading          BUE  ROM/Strength/  Fine motor Coordination Hand dominance: R    RUE: ROM WFL     Strength: grossly 4/5      Strength:  WFL     Coordination:   WFL    LUE: ROM  WFL     Strength: grossly 4/5      Strength:  WFL     Coordination: WFL       Hearing: WFL   Sensation:  No c/o numbness or tingling   Tone:  WFL   Edema:  None noted    Comments:   RN cleared patient for OT. Upon arrival, patient in bed. Patient sat EOB and stood EOB with no c/o dizziness or lightheadedness.    Therapist facilitated and instructed pt on adapted  techniques & compensatory strategies to improve safety and independence with basic ADLs, bed mobility,  functional transfers & mobility to allow pt to achieve highest level of independence and safely. At end of session, patient in bed with call light and phone within reach, all lines and tubes intact. Rehab Potential: Good  for established goals     Patient / Family Goal:  Not stated    Patient and/or family were instructed on functional diagnosis, prognosis/goals and OT plan of care. Demonstrated good understanding. Eval Complexity: Low    Time In: 2:40  Time Out: 2:58  Total Treatment Time: 0 minutes    Min Units   OT Eval Low 40491  x     OT Eval Medium 28883      OT Eval High 10092       OT Re-Eval [id number]       Therapeutic Ex 43107       Therapeutic Activities 02723      ADL/Self Care 09534      Orthotic Management 20027       Neuro Re-Ed 43543       Non-Billable Time          Evaluation Time includes thorough review of current medical information, gathering information on past medical history/social history and prior level of function, completion of standardized testing/informal observation of tasks, assessment of data and education on plan of care and goals.             [place], [place] #[id number]

## 2022-01-21 NOTE — PROGRESS NOTES
Report called to [address] [facility].      Family notified per patient request.

## 2022-01-21 NOTE — CARE COORDINATION
Call received from the patient's niece Mary Guido, [phone], to discuss transition of care plans. Explained that therapy is to see the patient today to help determine transition of care plans. Discussed [facility] vs [facility]. Mary Guido expressed understanding and is agreeable to either. Mary Guido will provide transportation home for the patient if that is the plan when she is medically stable to discharge. Reviewed input from Trauma and Neurosurgery. Will continue to follow.      Leo Hernandez RN.  [facility]  [phone]

## 2022-01-21 NOTE — DISCHARGE SUMMARY
Physician Discharge Summary     Patient ID:  Lyle Davis  [id number]  83 y.o.  1955    Admit date: 1/19/2022    Discharge date and time: 1/21/2022  8:00 PM     Admitting Physician: Reji Landaverde MD     Admission Diagnoses: Intracranial hemorrhage ([facility] [address] [I62.9]  SAH (subarachnoid hemorrhage) ([facility] [address] [I60.9]    Discharge Diagnoses: Active Problems:    SAH (subarachnoid hemorrhage) (HCC)    Intracranial hemorrhage (HCC)  Resolved Problems:    * No resolved hospital problems. *      Admission Condition: poor    Discharged Condition: stable    Indication for Admission: trauma consult, unwitnessed fall/ questionable injury. IPH/SDH/SAH    Hospital Course/Procedures/Operation/treatments:   1/19: trauma consult, unwitnessed fall/ questionable injury. IPH/SDH/SAH  1/20: No new findings on tertiary exam.  1/21: Patient scored 18/24 on PT. Plan for discharge home today. Consults:   IP CONSULT TO NEUROSURGERY  IP CONSULT TO TRAUMA SURGERY  IP CONSULT TO PALLIATIVE CARE    Significant Diagnostic Studies:   XR CHEST (2 VW)    Result Date: 1/19/2022  EXAMINATION: TWO XRAY VIEWS OF THE CHEST 1/19/2022 10:24 am COMPARISON: None. HISTORY: ORDERING SYSTEM PROVIDED HISTORY: fall TECHNOLOGIST PROVIDED HISTORY: Reason for exam:->fall FINDINGS: The lungs are without acute focal process. There is no effusion or pneumothorax. The cardiomediastinal silhouette is without acute process. The osseous structures are without acute process. No acute pulmonary process     XR PELVIS (1-2 VIEWS)    Result Date: 1/19/2022  EXAMINATION: ONE XRAY VIEW OF THE PELVIS 1/19/2022 10:24 am COMPARISON: None. HISTORY: ORDERING SYSTEM PROVIDED HISTORY: fall TECHNOLOGIST PROVIDED HISTORY: Reason for exam:->fall FINDINGS: AP view of the pelvis was obtained. There is no acute fracture or dislocation. The hip joint spaces are preserved and symmetric. The pubic symphysis is intact. The bilateral sacroiliac joints are patent and symmetric. There are degenerative changes within the lower lumbar spine. No acute fracture or dislocation. CT HEAD WO CONTRAST    Result Date: 1/19/2022  EXAMINATION: CT OF THE HEAD WITHOUT CONTRAST  1/19/2022 2:53 pm TECHNIQUE: CT of the head was performed without the administration of intravenous contrast. Dose modulation, iterative reconstruction, and/or weight based adjustment of the mA/kV was utilized to reduce the radiation dose to as low as reasonably achievable. COMPARISON: CT head 01/19/2022 at 1033 hours HISTORY: ORDERING SYSTEM PROVIDED HISTORY: trauma TECHNOLOGIST PROVIDED HISTORY: Reason for exam:->trauma Has a \"code stroke\" or \"stroke alert\" been called? ->No Decision Support Exception - unselect if not a suspected or confirmed emergency medical condition->Emergency Medical Condition (MA) What reading provider will be dictating this exam?->CRC FINDINGS: There are hemorrhagic contusions in the inferior frontal lobes bilaterally. This measures approximately 4.8 x 3.6 cm in in size on the left and 2.5 x 2.3 cm in size on the right. These appears stable compared to the prior examination. There is also an inferior right paramedian hemorrhagic contusion, stable. There is stable acute subdural hemorrhage in the bilateral frontal regions. This measures approximately 8 mm in greatest thickness on the right, stable. There is stable bilateral subarachnoid hemorrhage. There is also subarachnoid hemorrhage in the left posterior fossa. There is prominence of the extra-axial spaces in the bilateral cerebral hemispheres that could represent chronic subdural hygromas. There are small intraparenchymal hemorrhages within the left cerebellar hemisphere. There is probable subdural hemorrhage along the left tentorium. There are hypodensities within the bilateral cerebellar hemispheres. There is patchy hypoattenuation within the white matter of the bilateral cerebral hemispheres. There is no midline shift.   There is posterior scalp hematoma. The calvarium is intact. There is patchy mucosal thickening within the ethmoid and left sphenoid sinuses. The mastoid air cells are clear. 1. Stable hemorrhagic contusions in the bilateral frontal lobes and left cerebellar hemisphere. 2. Stable subarachnoid hemorrhage in the bilateral cerebral hemispheres and left cerebellar hemisphere. 3. Stable bifrontal subdural hemorrhage as well as subdural hemorrhage along the left tentorium. 4. Chronic small vessel ischemic disease. CT HEAD WO CONTRAST    Result Date: 1/19/2022  EXAMINATION: CT OF THE HEAD WITHOUT CONTRAST  1/19/2022 10:28 am TECHNIQUE: CT of the head was performed without the administration of intravenous contrast. Dose modulation, iterative reconstruction, and/or weight based adjustment of the mA/kV was utilized to reduce the radiation dose to as low as reasonably achievable. COMPARISON: None. HISTORY: ORDERING SYSTEM PROVIDED HISTORY: fall TECHNOLOGIST PROVIDED HISTORY: Reason for exam:->fall Has a \"code stroke\" or \"stroke alert\" been called? ->No Decision Support Exception - unselect if not a suspected or confirmed emergency medical condition->Emergency Medical Condition (MA) FINDINGS: BRAIN/VENTRICLES: There is bilateral frontal lobe hemorrhages left greater than right with small rim of surrounding edema. There also appears to be some associated subdural and arachnoid bleeding adjacent to the frontal lobes. There is no significant mass effect at this time on the ventricles and there does not appear to be any midline shift. There is also suggestion of a small amount of subarachnoid blood in the left posterior parietal region a small focus of hemorrhage in the left cerebellar reason was also seen peripherally ORBITS: The visualized portion of the orbits demonstrate no acute abnormality. SINUSES: The visualized paranasal sinuses and mastoid air cells demonstrate no acute abnormality.  SOFT TISSUES/SKULL:  There is a moderate size hematoma of the scalp superficial to the occipital bone. Bilateral frontal lobe intraparenchymal hemorrhage left greater than right with associated subarachnoid blood and epidural blood near the frontal lobes. There is small amount of subarachnoid blood in the left posterior parietal sulci along with a peripheral focus of hemorrhage in the left cerebellum. Large occipital scalp hematoma is also seen. No associated skull fracture is identified RECOMMENDATIONS: Unavailable     CT FACIAL BONES WO CONTRAST    Result Date: 1/19/2022  EXAMINATION: CT OF THE FACE WITHOUT CONTRAST  1/19/2022 10:28 am TECHNIQUE: CT of the face was performed without the administration of intravenous contrast. Multiplanar reformatted images are provided for review. Dose modulation, iterative reconstruction, and/or weight based adjustment of the mA/kV was utilized to reduce the radiation dose to as low as reasonably achievable. COMPARISON: None HISTORY: ORDERING SYSTEM PROVIDED HISTORY: fall TECHNOLOGIST PROVIDED HISTORY: Reason for exam:->fall Decision Support Exception - unselect if not a suspected or confirmed emergency medical condition->Emergency Medical Condition (MA) FINDINGS: FACIAL BONES:  The maxilla, pterygoid plates and zygomatic arches are intact. The mandible is intact. The mandibular condyles are normally situated. The nasal bones and maxillary nasal processes are intact. ORBITS:  The globes appear intact. The extraocular muscles, optic nerve sheath complexes and lacrimal glands appear unremarkable. No retrobulbar hematoma or mass is seen. The orbital walls and rims are intact. SINUSES/MASTOIDS:  The paranasal sinuses and mastoid air cells are well aerated. No acute fracture is seen. SOFT TISSUES:  Parenchymal and subdural hemorrhage identified in bilateral frontal lobes. No acute traumatic injury of the facial bones. Parenchymal and subdural hemorrhage identified in bilateral frontal lobes.  Please refer to separate report CT of the head for further detail. RECOMMENDATIONS: Unavailable     CT CHEST W CONTRAST    Result Date: 1/19/2022  EXAMINATION: CT OF THE CHEST WITH CONTRAST; CT OF THE ABDOMEN AND PELVIS WITH CONTRAST 1/19/2022 2:53 pm TECHNIQUE: CT of the chest was performed with the administration of intravenous contrast. Multiplanar reformatted images are provided for review. Dose modulation, iterative reconstruction, and/or weight based adjustment of the mA/kV was utilized to reduce the radiation dose to as low as reasonably achievable.; CT of the abdomen and pelvis was performed with the administration of intravenous contrast. Multiplanar reformatted images are provided for review. Dose modulation, iterative reconstruction, and/or weight based adjustment of the mA/kV was utilized to reduce the radiation dose to as low as reasonably achievable. COMPARISON: None. HISTORY: ORDERING SYSTEM PROVIDED HISTORY: trauma TECHNOLOGIST PROVIDED HISTORY: Reason for exam:->trauma What reading provider will be dictating this exam?->CRC FINDINGS: CT chest. The heart and the great vessels are normal.  The trachea and major bronchi are patent. No enlarged mediastinal or hilar lymph nodes are present. There is minimal atelectasis in the lung bases. Lungs are otherwise free of acute infiltrate, pleural effusion or pneumothorax. There is mild compression deformity of the superior and inferior endplate of V03 with nearly 30% loss of height, the age of which is uncertain. Old fracture deformity of the left scapula is noted. CT abdomen and pelvis. The liver is of normal architecture. Gallbladder is partially distended. The spleen, pancreas, the adrenals and the kidneys are normal.  Degenerative changes are noted in the lumbar spine with previously noted compression deformity of T11. Pelvis. Bladder is distended. Colon is collapsed with tortuous redundant appearance with poor delineation of the anatomy.   Appendix cannot be identified. If colon needs further assessment, consider colonoscopy. Compression deformity of T11 with 30% loss of height of unknown age. No other acute traumatic injury is identified in the chest.  There is no focal infiltrates. There is no acute inflammation, acute traumatic injury or bowel obstruction in the abdomen pelvis. The redundant tortuous colon. See above. RECOMMENDATIONS: Unavailable     CT CERVICAL SPINE WO CONTRAST    Result Date: 1/19/2022  EXAMINATION: CT OF THE CERVICAL SPINE WITHOUT CONTRAST 1/19/2022 10:28 am TECHNIQUE: CT of the cervical spine was performed without the administration of intravenous contrast. Multiplanar reformatted images are provided for review. Dose modulation, iterative reconstruction, and/or weight based adjustment of the mA/kV was utilized to reduce the radiation dose to as low as reasonably achievable. COMPARISON: None. HISTORY: ORDERING SYSTEM PROVIDED HISTORY: fall TECHNOLOGIST PROVIDED HISTORY: Reason for exam:->fall Decision Support Exception - unselect if not a suspected or confirmed emergency medical condition->Emergency Medical Condition (MA) FINDINGS: The ring of C1 is intact as is the dense. There is no compression fracture of the cervical spine. No jumped or perched facet is noted. Multilevel degenerative disc and degenerative joint disease is noted. The prevertebral soft tissues are unremarkable. The airway is widely patent. Images through the lung apices are negative for a pneumothorax. 1. There is no acute compression fracture or subluxation of the cervical spine. 2. Advanced multilevel degenerative disc and degenerative joint disease.      CT ABDOMEN PELVIS W IV CONTRAST Additional Contrast? None    Result Date: 1/19/2022  EXAMINATION: CT OF THE CHEST WITH CONTRAST; CT OF THE ABDOMEN AND PELVIS WITH CONTRAST 1/19/2022 2:53 pm TECHNIQUE: CT of the chest was performed with the administration of intravenous contrast. Multiplanar reformatted images are provided for review. Dose modulation, iterative reconstruction, and/or weight based adjustment of the mA/kV was utilized to reduce the radiation dose to as low as reasonably achievable.; CT of the abdomen and pelvis was performed with the administration of intravenous contrast. Multiplanar reformatted images are provided for review. Dose modulation, iterative reconstruction, and/or weight based adjustment of the mA/kV was utilized to reduce the radiation dose to as low as reasonably achievable. COMPARISON: None. HISTORY: ORDERING SYSTEM PROVIDED HISTORY: trauma TECHNOLOGIST PROVIDED HISTORY: Reason for exam:->trauma What reading provider will be dictating this exam?->CRC FINDINGS: CT chest. The heart and the great vessels are normal.  The trachea and major bronchi are patent. No enlarged mediastinal or hilar lymph nodes are present. There is minimal atelectasis in the lung bases. Lungs are otherwise free of acute infiltrate, pleural effusion or pneumothorax. There is mild compression deformity of the superior and inferior endplate of P23 with nearly 30% loss of height, the age of which is uncertain. Old fracture deformity of the left scapula is noted. CT abdomen and pelvis. The liver is of normal architecture. Gallbladder is partially distended. The spleen, pancreas, the adrenals and the kidneys are normal.  Degenerative changes are noted in the lumbar spine with previously noted compression deformity of T11. Pelvis. Bladder is distended. Colon is collapsed with tortuous redundant appearance with poor delineation of the anatomy. Appendix cannot be identified. If colon needs further assessment, consider colonoscopy. Compression deformity of T11 with 30% loss of height of unknown age. No other acute traumatic injury is identified in the chest.  There is no focal infiltrates. There is no acute inflammation, acute traumatic injury or bowel obstruction in the abdomen pelvis.  The redundant tortuous colon.  See above. RECOMMENDATIONS: Unavailable     MRI BRAIN W WO CONTRAST    Result Date: 1/19/2022  EXAMINATION: MRI OF THE BRAIN WITHOUT AND WITH CONTRAST  1/19/2022 4:58 pm TECHNIQUE: Multiplanar multisequence MRI of the head/brain was performed without and with the administration of intravenous contrast. COMPARISON: Same-day head CT HISTORY: ORDERING SYSTEM PROVIDED HISTORY: ICH TECHNOLOGIST PROVIDED HISTORY: Reason for exam:->ICH What reading provider will be dictating this exam?->CRC FINDINGS: There are stable foci of intraparenchymal hemorrhage within bilateral inferior frontal gyrus with moderate amount of surrounding vasogenic edema. The intraparenchymal hemorrhage measures approximately 3.6 x 4.8 cm on the left side and 2.5 x 2.2 cm on the right side. There is unchanged susceptibility artifact within bilateral temporoparietal sulci and cerebellar sulci consistent with subarachnoid hemorrhage. There are unchanged bilateral subcentimeter hemispheric subdural collections with T1 hypointensity and T2 hyperintensity with scattered areas of susceptibility artifact mostly consistent with acute on chronic subdural hematomas. Unchanged 3 mm thickening along the left side of falx likely representative of small subdural hematoma. Scattered areas of T2/FLAIR hyperintensity with susceptibility artifact within bilateral cerebellar hemisphere are also likely suggestive of traumatic injury with associated brain contusion. No intracranial mass lesion. No focus of abnormal enhancement. No midline shift. There are mild nonspecific foci of periventricular and subcortical cerebral white matter T2/FLAIR hyperintensity, most likely representing chronic microangiopathic disease in this age group. The brain parenchyma is otherwise normal. The pituitary gland is normal in appearance. The cerebellar tonsils are in normal position. The ventricles, sulci, and cisterns are prominent suggestive of generalized volume loss.  The intracranial flow voids are preserved. The globes and orbits are within normal limits. Extensive amount subcutaneous hematoma within the parietooccipital region. The visualized extracranial structures including paranasal sinuses and mastoid air cells are unremarkable. IMPRESSION Stable foci of intraparenchymal hemorrhagic contusions within bilateral frontal lobes with no abnormal enhancement. Unchanged subarachnoid hemorrhage of bilateral temporoparietal sulci and cerebellar sulci. . Unchanged bilateral subcentimeter hemispheric subdural collections consistent with acute on chronic subdural hematomas. Unchanged 3 mm thickening along the left side of falx likely representative of small subdural hematoma. Scattered areas of T2/FLAIR hyperintensity with susceptibility artifact within bilateral cerebellar hemisphere are also likely suggestive of traumatic injury with associated brain contusion. RECOMMENDATIONS: Unavailable       Discharge Exam:  Asleep but arousable  Follows commands  PERRL; moist mucous membranes  Hrt:  Regular rate/rhythm; no murmur  Lungs:  Fairly clear bilaterally  Abd:  Soft; BS active; NT/ND  Skin:  Warm/dry; periorbital ecchymosis  Ext:  Strength 5/5 bilateral upper/lower ext; no sensorimotor deficits    Disposition: home    In process/preliminary results:  Outstanding Order Results       No orders found from 12/21/2021 to 1/20/2022.             Patient Instructions:   Current Discharge Medication List             Details   levETIRAcetam (KEPPRA) 500 MG tablet Take 1 tablet by mouth 2 times daily for 9 doses  Qty: 9 tablet, Refills: 0      bisacodyl (DULCOLAX) 5 MG EC tablet Take 1 tablet by mouth daily as needed for Constipation  Qty: 30 tablet, Refills: 0      polyethylene glycol (GLYCOLAX) 17 g packet Take 17 g by mouth daily  Qty: 527 g, Refills: 1                Details   atorvastatin (LIPITOR) 10 MG tablet Take 1 tablet by mouth daily  Qty: 90 tablet, Refills: 5    Associated Diagnoses: Mixed hyperlipidemia      levothyroxine (SYNTHROID) 25 MCG tablet Take 1 tablet by mouth Daily  Qty: 90 tablet, Refills: 3    Associated Diagnoses: Acquired hypothyroidism      atenolol (TENORMIN) 50 MG tablet Take 1 tablet by mouth daily  Qty: 90 tablet, Refills: 3    Associated Diagnoses: Essential hypertension      omeprazole (PRILOSEC) 40 MG delayed release capsule Take 1 capsule by mouth every morning (before breakfast)  Qty: 90 capsule, Refills: 3    Associated Diagnoses: Gastroesophageal reflux disease without esophagitis      diclofenac sodium (VOLTAREN) 1 % GEL Apply 4 g topically 2 times daily  Qty: 350 g, Refills: 0             Subdural Hematoma: Care Instructions  Your Care Instructions  A subdural hematoma is a buildup of blood between the layers of tissue that cover the brain. The blood collects under the layer closest to the skull. (This layer is called the dura.) The bleeding is most often caused by a head injury, but there can be other causes. In an older adult, even a minor injury can lead to a subdural hematoma. The buildup of blood inside the skull can put pressure on the brain. This may cause symptoms, such as a severe headache, confusion, or seizures. There are two kinds of hematomas: acute and chronic. With an acute hematoma, symptoms start soon after the injury. With a chronic hematoma, it may be days or weeks before symptoms appear. Doctors use imaging tests to find the buildup of blood. You may have a test such as a CT scan or MRI. The doctor may also do a test to check the pressure inside your skull. Bleeding inside the skull may get worse over time. So it is very important to pay attention to your symptoms. And be sure to see your doctor for follow-up testing. In some cases, treatment is needed to remove the blood. This helps relieve the pressure on the brain. Your doctor may make one or two small holes in your skull.  For a large hematoma, the doctor may need to remove a piece of the skull. You may not need treatment if you have a small hematoma that is not causing symptoms. If you take aspirin or some other blood thinner, you may need to stop taking it. The doctor may give you treatment to undo the effects of the blood thinner. This can help prevent more bleeding in the skull. The doctor has checked you carefully, but problems can develop later. If you notice any problems or new symptoms, get medical treatment right away. Follow-up care is a key part of your treatment and safety. Be sure to make and go to all appointments, and call your doctor if you are having problems. It's also a good idea to know your test results and keep a list of the medicines you take. How can you care for yourself at home? For an acute hematoma  Follow your doctor's instructions. He or she will tell you if you need someone to watch you closely for the next 24 hours or longer. For a chronic hematoma  Get plenty of sleep at night, and take it easy during the day. Rest is the best way to recover. Avoid activities that are physically or mentally demanding. These include housework, exercise, paperwork, video games, text messaging, and using the computer. You may need to change your work or school schedule for a while. Return to your normal activities slowly. Do not try to do too much at once. For either type of hematoma  Do not drink alcohol until your doctor says it is okay. Don't drive a car, ride a bike, or operate machinery until your doctor says it's okay. If you take aspirin or some other blood thinner, be sure to talk to your doctor. He or she will tell you if and when to start taking this medicine again. Make sure that you understand exactly what your doctor wants you to do. If you normally take medicine, your doctor will tell you if and when you can restart it. He or she will also give you instructions about taking any new medicines. When should you call for help?    Call 911 anytime you think you may need emergency care. For example, call if:    You have a seizure. You passed out (lost consciousness). You are confused or can't stay awake. Call your doctor now or seek immediate medical care if:    You have new or worse vomiting. You feel less alert. You have new weakness or numbness in any part of your body. You have a headache that is getting worse. Watch closely for changes in your health, and be sure to contact your doctor if:    You do not get better as expected. You have new symptoms, such as headaches, trouble concentrating, or changes in mood. Where can you learn more? Go to [email or web address]://[email or web address].[email or web address]. org and sign in to your [employer] account. Enter H042 in the [employer] box to learn more about \"Subdural Hematoma: Care Instructions. \"     If you do not have an account, please click on the \"Sign Up Now\" link. Current as of: April 8, 2021               Content Version: 13.1  © 2006-2021 "[employer] Care instructions adapted under license by [facility] ([facility]). If you have questions about a medical condition or this instruction, always ask your healthcare professional. [place] [address] any warranty or liability for your use of this information. Compression Fracture of the Spine: Care Instructions  Your Care Instructions     A compression fracture happens when the front part of a spinal bone breaks and collapses. A fall or other accident can cause it. A minor injury or moving the wrong way can cause a break if you have thin or brittle bones (osteoporosis). These types of breaks will heal in 8 to 10 weeks. You will need rest and pain medicines. Your doctor may recommend physical therapy. Some doctors recommend that certain people with compression fractures wear braces. Your doctor also may treat thin or brittle bones.  You may need surgery if you have lasting pain or if the bone presses on the spinal cord or nerves. You heal best when you take good care of yourself. Eat a variety of healthy foods, and don't smoke. Follow-up care is a key part of your treatment and safety. Be sure to make and go to all appointments, and call your doctor if you are having problems. It's also a good idea to know your test results and keep a list of the medicines you take. How can you care for yourself at home? Be safe with medicines. Read and follow all instructions on the label. If the doctor gave you a prescription medicine for pain, take it as prescribed. If you are not taking a prescription pain medicine, ask your doctor if you can take an over-the-counter medicine. Talk to your doctor about how to make your bones stronger. You may need medicines or a change in what you eat. Be active only as directed by your doctor. When should you call for help? Call 911 anytime you think you may need emergency care. For example, call if:    You are unable to move an arm or a leg at all. Call your doctor now or seek immediate medical care if:    You have new or worse symptoms in your arms, legs, belly, or buttocks. Symptoms may include:  Numbness or tingling. Weakness. Pain. You lose bladder or bowel control. You have belly pain, bloating, vomiting, or nausea. Watch closely for changes in your health, and be sure to contact your doctor if:    You do not get better as expected. Where can you learn more? Go to [email or web address]"[email or web address]"peCIQUAL.Triptease. org and sign in to your [employer] account. Enter P445 in the [facility] box to learn more about \"Compression Fracture of the Spine: Care Instructions. \"     If you do not have an account, please click on the \"Sign Up Now\" link. Current as of: July 1, 2021               Content Version: 13.1  © 1713-6530 Healthwise, Incorporated. Care instructions adapted under license by [facility] ([facility]).  If you have questions about a medical condition or this instruction, always ask your healthcare professional. [place] [address] any warranty or liability for your use of this information. TRAUMA SERVICES DISCHARGE INSTRUCTIONS    Call [phone], option 2, for any questions/concerns and for follow-up appointment in 1 week(s). Please follow the instructions checked below:    Please follow-up with your primary care provider. ACTIVITY INSTRUCTIONS  Increase activity as tolerated  No heavy lifting or strenuous activity  Take your incentive spirometer home and use 4-6 times/day   [x]  No driving until cleared by trauma clinic    WOUND/DRESSING INSTRUCTIONS:  You may shower. No sitting in bath tub, hot tub or swimming until cleared by physician. Ice to areas of pain for first 24 hours. Heat to areas of pain after that. Wash areas of lacerations/abrasions with soap & water. Rinse well. Pat dry with clean towel. Apply thin layer of Bacitracin, Neosporin, or triple antibiotic cream to affected area 2-3 times per day. Keep wounds clean and dry. MEDICATION INSTRUCTIONS  Take medication as prescribed. When taking pain medications, you may experience dizziness or drowsiness. Do not drink alcohol or drive when taking these medications. You may experience constipation while taking pain medication. You may take over the counter stool softeners such as docusate (Colace), sennosides S (Senokot-S), or Miralax. [x]  You may take Ibuprofen (over the counter) as directed for mild pain. --You may take up to 800mg every 8 hours for pain, please take with food or milk. [x]  You may take acetaminophen (Tylenol) products. Do NOT take more than 4000mg of Tylenol in 24h. OPIOID MEDICATION INSTRUCTIONS  Read the medication guide that is included with your prescription. Take your medication exactly as prescribed. Store medication away from children and in a safe place. Do NOT share your medication with others. Do NOT take medication unless it is prescribed for you.   Do NOT drink alcohol while taking opioids (I.e., Norco, Percocet, Oxycodone, etc). Discuss with the Trauma Clinic staff if the dose of medication you are taking does not control your pain and any side effects that you may be having. CALL 911 OR YOUR LOCAL EMERGENCY SERVICE:  --If you take too much medication  --If you have trouble breathing or shortness of breath  --A child has taken this medication. WORK:  You may not return to work until you receive follow-up with the Trauma Clinic or clearance by all consultants. Call the trauma clinic for any of the following or for questions/concerns;  --fever over 101F  --redness, swelling, hardness or warmth at the wound site(s). --Unrelieved nausea/vomiting  --Foul smelling or cloudy drainage at the wound site(s)  --Unrelieved pain or increase in pain  --Increase in shortness of breath    Follow-up:  Trauma Clinic: [phone] option [address]  [place], [address] [place] [address]      Follow up: Charlie Serna MD  [address] [place] [address]  [phone]    Schedule an appointment as soon as possible for a visit in 1 month  follow-up in the office in 1 month. You will need a repeat MRI of the brain prior to your appointment. DO Junaid Garibay[address] [address]  [phone]    Schedule an appointment as soon as possible for a visit in 1 week  Hospital follow-up in the office.      [address] [place] [address]  [phone]  Schedule an appointment as soon as possible for a visit in 1 week  SDH/IPH/SAH       Signed:  Electronically signed by Citllai Mckeon MD on 1/21/2022 at 6:15 PM

## 2022-01-24 ENCOUNTER — TELEPHONE (OUTPATIENT)
Dept: PRIMARY CARE CLINIC | Age: 67
End: 2022-01-24

## 2022-01-24 NOTE — TELEPHONE ENCOUNTER
[facility] Trans[facility] Initial Follow Up Call    Outreach made within 2 business days of discharge: Yes    Patient: Lyle Davis Patient : 1955   MRN: [id number]  Reason for Admission: [facility]  Discharge Date: 22       Spoke with: Unable to reach patient. Memory full on voicemail. Will try again at later time.      Discharge department/facility: [facility]       Scheduled appointment with PCP within 7-14 days    Follow Up  Future Appointments   Date Time Provider Marie Ellington   2022  8:30 AM [facility] MRI RM 1 SEYZ MRI [facility] Radiolo   2022 12:00 PM Marley Angeles MD [address] [place] [address]

## 2022-01-25 ENCOUNTER — TELEPHONE (OUTPATIENT)
Dept: PRIMARY CARE CLINIC | Age: 67
End: 2022-01-25

## 2022-01-25 NOTE — TELEPHONE ENCOUNTER
[facility] Transitions Initial Follow Up Call    Outreach made within 2 business days of discharge: Yes    Patient: Jennifer Camarillo Patient : 1955   MRN: [id number]  Reason for Admission: [facility]   Discharge Date: 22       Spoke with: Unable to speak with patient. Called placed to [phone] - memory full on machine. Call placed to [phone] - no answer and no machine.       Discharge department/facility: [facility]     Scheduled appointment with PCP within 7-14 days    Follow Up  Future Appointments   Date Time Provider Marie Ellington   2022  3:15 PM Yohan Chaves, DO [facility]   2022  8:30 AM [facility] MRI RM 1 SEYZ MRI [facility] Radiolo   2022 12:00 PM Nolan Zhao MD [address] [place] [address]

## 2022-01-28 ENCOUNTER — OFFICE VISIT (OUTPATIENT)
Dept: PRIMARY CARE CLINIC | Age: 67
End: 2022-01-28
Payer: COMMERCIAL

## 2022-01-28 VITALS
HEIGHT: 61 IN | TEMPERATURE: 97 F | WEIGHT: 111 LBS | OXYGEN SATURATION: 98 % | HEART RATE: 76 BPM | RESPIRATION RATE: 18 BRPM | BODY MASS INDEX: 20.96 KG/M2

## 2022-01-28 DIAGNOSIS — E03.9 ACQUIRED HYPOTHYROIDISM: ICD-10-CM

## 2022-01-28 DIAGNOSIS — I10 ESSENTIAL HYPERTENSION: Chronic | ICD-10-CM

## 2022-01-28 DIAGNOSIS — I62.9 INTRACRANIAL HEMORRHAGE (HCC): ICD-10-CM

## 2022-01-28 DIAGNOSIS — E11.9 DIET-CONTROLLED DIABETES MELLITUS (HCC): ICD-10-CM

## 2022-01-28 DIAGNOSIS — E78.2 MIXED HYPERLIPIDEMIA: Chronic | ICD-10-CM

## 2022-01-28 DIAGNOSIS — D51.8 VITAMIN B12 DEFICIENCY (DIETARY) ANEMIA: ICD-10-CM

## 2022-01-28 DIAGNOSIS — I60.9 SAH (SUBARACHNOID HEMORRHAGE) (HCC): Primary | ICD-10-CM

## 2022-01-28 DIAGNOSIS — M81.0 AGE-RELATED OSTEOPOROSIS WITHOUT CURRENT PATHOLOGICAL FRACTURE: ICD-10-CM

## 2022-01-28 PROCEDURE — 1111F DSCHRG MED/CURRENT MED MERGE: CPT | Performed by: FAMILY MEDICINE

## 2022-01-28 PROCEDURE — 99496 TRANSJ CARE MGMT HIGH F2F 7D: CPT | Performed by: FAMILY MEDICINE

## 2022-01-28 NOTE — PROGRESS NOTES
Post-Discharge Transitional Care Management Services or Hospital Follow Up      Court Case   YOB: 1955    Date of Office Visit:  1/28/2022  Date of Hospital Admission: 1/19/22  Date of Hospital Discharge: 1/21/22  Risk of hospital readmission (high >=14%. Medium >=10%) :Readmission Risk Score: 10.6 ( )      Care management risk score Rising risk (score 2-5) and Complex Care (Scores >=6): 0     Non face to face  following discharge, date last encounter closed (first attempt may have been earlier): 1/25/2022  2:55 PM    Call initiated 2 business days of discharge: Yes    Patient Active Problem List   Diagnosis    Essential hypertension    Mixed hyperlipidemia    Acquired hypothyroidism    Gastroesophageal reflux disease without esophagitis    Neuropathy of both feet    Elevated liver function tests    SAH (subarachnoid hemorrhage) ([address]    Intracranial hemorrhage ([address]       Allergies   Allergen Reactions    Sulfa Antibiotics        Medications listed as ordered at the time of discharge from hospital     Medication List          Accurate as of January 28, 2022  3:51 PM. If you have any questions, ask your nurse or doctor.             CONTINUE taking these medications    atenolol 50 MG tablet  Commonly known as: TENORMIN  Take 1 tablet by mouth daily     atorvastatin 10 MG tablet  Commonly known as: LIPITOR  Take 1 tablet by mouth daily     bisacodyl 5 MG EC tablet  Commonly known as: DULCOLAX  Take 1 tablet by mouth daily as needed for Constipation     diclofenac sodium 1 % Gel  Commonly known as: VOLTAREN  Apply 4 g topically 2 times daily     levETIRAcetam 500 MG tablet  Commonly known as: KEPPRA  Take 1 tablet by mouth 2 times daily for 9 doses     levothyroxine 25 MCG tablet  Commonly known as: SYNTHROID  Take 1 tablet by mouth Daily     omeprazole 40 MG delayed release capsule  Commonly known as: PRILOSEC  Take 1 capsule by mouth every morning (before breakfast)     polyethylene glycol 17 g packet  Commonly known as: GLYCOLAX  Take 17 g by mouth daily              Medications marked \"taking\" at this time  No outpatient medications have been marked as taking for the 1/28/22 encounter (Office Visit) with Zoey Ying DO.        Medications patient taking as of now reconciled against medications ordered at time of hospital discharge: Yes    Chief Complaint   Patient presents with    Follow-Up from Hospital     intracranial hemorrhage, headache       History of Present illness - Follow up of Hospital diagnosis(es): Subarachnoid hemorrhage. Inpatient course: Discharge summary reviewed- see chart. Interval history/Current status: Patient presents accompanied by her son for hospital follow-up. She went to ER on January 19 with sudden headache found to have a subarachnoid hemorrhage transferred to [facility] seen by neurosurgery. Started on [address] Monitor for a few days. She presents today feeling much better she has a bit sluggish and tired does not have a great appetite. They did not bring the hospital discharge summary and they are unsure of the follow-up I see there is an appointment on the schedule him to see the neurosurgeon with an MRI in a couple weeks. They are asking about her going back to work and driving. I examined I advised him not to either until he see neurosurgery. she was due to see me in July for 6-month checkup and failed to do so. A comprehensive review of systems was negative except for what was noted in the HPI. Vitals:    01/28/22 1511   Pulse: 76   Resp: 18   Temp: 97 °F (36.1 °C)   TempSrc: Temporal   SpO2: 98%   Weight: 111 lb (50.3 kg)   Height: 5' 1\" (1.549 m)     Body mass index is 20.97 kg/m².    Wt Readings from Last 3 Encounters:   01/28/22 111 lb (50.3 kg)   01/19/22 111 lb 5.3 oz (50.5 kg)   01/19/21 112 lb (50.8 kg)     BP Readings from Last 3 Encounters:   01/21/22 (!) 103/53   01/19/22 128/86   03/23/21 123/78 complexity

## 2022-02-01 ENCOUNTER — OFFICE VISIT (OUTPATIENT)
Dept: SURGERY | Age: 67
End: 2022-02-01
Payer: COMMERCIAL

## 2022-02-01 VITALS
HEIGHT: 61 IN | WEIGHT: 111 LBS | RESPIRATION RATE: 16 BRPM | SYSTOLIC BLOOD PRESSURE: 124 MMHG | BODY MASS INDEX: 20.96 KG/M2 | OXYGEN SATURATION: 99 % | TEMPERATURE: 97.8 F | HEART RATE: 64 BPM | DIASTOLIC BLOOD PRESSURE: 69 MMHG
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
## 2022-02-01 DIAGNOSIS — I60.9 SAH (SUBARACHNOID HEMORRHAGE) (HCC): ICD-10-CM

## 2022-02-01 PROCEDURE — 99212 OFFICE O/P EST SF 10 MIN: CPT | Performed by: NURSE PRACTITIONER

## 2022-02-01 NOTE — PROGRESS NOTES
Trauma Clinic Progress Note   2/1/2022     Date of injury: 1/19/2022         AMY/Injuries:   Patient Active Problem List   Diagnosis Code    Essential hypertension I10    Mixed hyperlipidemia E78.2    Acquired hypothyroidism E03.9    Gastroesophageal reflux disease without esophagitis K21.9    Neuropathy of both feet G57.93    Elevated liver function tests R79.89    SAH (subarachnoid hemorrhage) (HCC) I60.9    Intracranial hemorrhage (HCC) I62.9       Surgeries:   No past surgical history on file. Vital signs:    /69   Pulse 64   Temp 97.8 °F (36.6 °C) (Infrared)   Resp 16   Ht 5' 1\" (1.549 m)   Wt 111 lb (50.3 kg)   LMP  (LMP Unknown)   SpO2 99%   BMI 20.97 kg/m²     Medications:    Prior to Admission medications    Medication Sig Start Date End Date Taking? Authorizing Provider   atorvastatin (LIPITOR) 10 MG tablet Take 1 tablet by mouth daily 9/21/21  Yes David Clayton DO   levothyroxine (SYNTHROID) 25 MCG tablet Take 1 tablet by mouth Daily 9/21/21  Yes David Clayton DO   atenolol (TENORMIN) 50 MG tablet Take 1 tablet by mouth daily 9/21/21  Yes David Clayton DO   omeprazole (PRILOSEC) 40 MG delayed release capsule Take 1 capsule by mouth every morning (before breakfast) 9/21/21  Yes David Clayton DO   levETIRAcetam (KEPPRA) 500 MG tablet Take 1 tablet by mouth 2 times daily for 9 doses  Patient not taking: Reported on 2/1/2022 1/21/22 1/26/22  Alma Delia Madrigal MD   diclofenac sodium (VOLTAREN) 1 % GEL Apply 4 g topically 2 times daily 1/27/21   Tarun Sneed DPM          CC:  Trauma follow up    Gordon Estrada presents to trauma clinic for follow up of injury sustained from a fall. She was diagnosed with a subarachnoid hemorrhage. She states she has daily headaches. She denies any nausea vomiting she denies any difficulty with concentration or memory. She is inquiring about returning to work.   Discussed of the decision for returning to work will be up to neurosurgery. States that she is eating her prefall amount. States she is sleeping at her prefall level. Physical Exam  Physical Exam  Vitals reviewed. Constitutional:       Appearance: Normal appearance. Cardiovascular:      Rate and Rhythm: Normal rate and regular rhythm. Pulmonary:      Effort: Pulmonary effort is normal.      Breath sounds: Normal breath sounds. Musculoskeletal:         General: Normal range of motion. Skin:     General: Skin is warm and dry. Neurological:      General: No focal deficit present. Mental Status: She is alert and oriented to person, place, and time. Psychiatric:         Mood and Affect: Mood normal.         Behavior: Behavior normal.         Thought Content: Thought content normal.         Judgment: Judgment normal.           Controlled substances monitoring: not applicable. Assessment  Patient Active Problem List   Diagnosis Code    Essential hypertension I10    Mixed hyperlipidemia E78.2    Acquired hypothyroidism E03.9    Gastroesophageal reflux disease without esophagitis K21.9    Neuropathy of both feet G57.93    Elevated liver function tests R79.89    SAH (subarachnoid hemorrhage) (HCC) I60.9    Intracranial hemorrhage (HCC) I62.9       RTC as needed.        Karol Borrego DNP, APRN- CNP  2/1/2022  6:05 PM

## 2022-02-09 ENCOUNTER — TELEPHONE (OUTPATIENT)
Dept: PRIMARY CARE CLINIC | Age: 67
End: 2022-02-09

## 2022-02-09 NOTE — TELEPHONE ENCOUNTER
Pt called back.   Decided to wait until she sees Dr. Inessa Mcbride next week to get her work release

## 2022-02-14 ENCOUNTER — TELEPHONE (OUTPATIENT)
Dept: PRIMARY CARE CLINIC | Age: 67
End: 2022-02-14

## 2022-02-16 DIAGNOSIS — D51.8 VITAMIN B12 DEFICIENCY (DIETARY) ANEMIA: ICD-10-CM

## 2022-02-16 DIAGNOSIS — I10 ESSENTIAL HYPERTENSION: Chronic | ICD-10-CM

## 2022-02-16 DIAGNOSIS — M81.0 AGE-RELATED OSTEOPOROSIS WITHOUT CURRENT PATHOLOGICAL FRACTURE: ICD-10-CM

## 2022-02-16 DIAGNOSIS — E03.9 ACQUIRED HYPOTHYROIDISM: ICD-10-CM

## 2022-02-16 DIAGNOSIS — I61.0 NONTRAUMATIC SUBCORTICAL HEMORRHAGE OF CEREBRAL HEMISPHERE, UNSPECIFIED LATERALITY (HCC): ICD-10-CM

## 2022-02-16 DIAGNOSIS — E11.9 DIET-CONTROLLED DIABETES MELLITUS (HCC): ICD-10-CM

## 2022-02-16 DIAGNOSIS — E78.2 MIXED HYPERLIPIDEMIA: Chronic | ICD-10-CM

## 2022-02-16 LAB
ALBUMIN SERPL-MCNC: 4.6 G/DL (ref 3.5–5.2)
ALP BLD-CCNC: 87 U/L (ref 35–104)
ALT SERPL-CCNC: 42 U/L (ref 0–32)
ANION GAP SERPL CALCULATED.3IONS-SCNC: 14 MMOL/L (ref 7–16)
AST SERPL-CCNC: 41 U/L (ref 0–31)
BASOPHILS ABSOLUTE: 0.03 E9/L (ref 0–0.2)
BASOPHILS RELATIVE PERCENT: 0.6 % (ref 0–2)
BILIRUB SERPL-MCNC: 0.7 MG/DL (ref 0–1.2)
BUN BLDV-MCNC: 8 MG/DL (ref 6–23)
CALCIUM SERPL-MCNC: 10.4 MG/DL (ref 8.6–10.2)
CHLORIDE BLD-SCNC: 99 MMOL/L (ref 98–107)
CHOLESTEROL, TOTAL: 244 MG/DL (ref 0–199)
CO2: 25 MMOL/L (ref 22–29)
CREAT SERPL-MCNC: 0.7 MG/DL (ref 0.5–1)
EOSINOPHILS ABSOLUTE: 0.04 E9/L (ref 0.05–0.5)
EOSINOPHILS RELATIVE PERCENT: 0.8 % (ref 0–6)
GFR AFRICAN AMERICAN: >60
GFR NON-AFRICAN AMERICAN: >60 ML/MIN/1.73
GLUCOSE BLD-MCNC: 101 MG/DL (ref 74–99)
HBA1C MFR BLD: 5.3 % (ref 4–5.6)
HCT VFR BLD CALC: 44.1 % (ref 34–48)
HDLC SERPL-MCNC: 86 MG/DL
HEMOGLOBIN: 14.3 G/DL (ref 11.5–15.5)
IMMATURE GRANULOCYTES #: 0.01 E9/L
IMMATURE GRANULOCYTES %: 0.2 % (ref 0–5)
LDL CHOLESTEROL CALCULATED: 144 MG/DL (ref 0–99)
LYMPHOCYTES ABSOLUTE: 1.95 E9/L (ref 1.5–4)
LYMPHOCYTES RELATIVE PERCENT: 40.4 % (ref 20–42)
MCH RBC QN AUTO: 32.5 PG (ref 26–35)
MCHC RBC AUTO-ENTMCNC: 32.4 % (ref 32–34.5)
MCV RBC AUTO: 100.2 FL (ref 80–99.9)
MONOCYTES ABSOLUTE: 0.38 E9/L (ref 0.1–0.95)
MONOCYTES RELATIVE PERCENT: 7.9 % (ref 2–12)
NEUTROPHILS ABSOLUTE: 2.42 E9/L (ref 1.8–7.3)
NEUTROPHILS RELATIVE PERCENT: 50.1 % (ref 43–80)
PDW BLD-RTO: 12.7 FL (ref 11.5–15)
PLATELET # BLD: 187 E9/L (ref 130–450)
PMV BLD AUTO: 10 FL (ref 7–12)
POTASSIUM SERPL-SCNC: 5.2 MMOL/L (ref 3.5–5)
RBC # BLD: 4.4 E12/L (ref 3.5–5.5)
SODIUM BLD-SCNC: 138 MMOL/L (ref 132–146)
T4 TOTAL: 6.6 MCG/DL (ref 4.5–11.7)
TOTAL PROTEIN: 7.6 G/DL (ref 6.4–8.3)
TRIGL SERPL-MCNC: 71 MG/DL (ref 0–149)
TSH SERPL DL<=0.05 MIU/L-ACNC: 1.43 UIU/ML (ref 0.27–4.2)
VITAMIN B-12: 449 PG/ML (ref 211–946)
VITAMIN D 25-HYDROXY: 56 NG/ML (ref 30–100)
VLDLC SERPL CALC-MCNC: 14 MG/DL
WBC # BLD: 4.8 E9/L (ref 4.5–11.5)

## 2022-02-17 ENCOUNTER — OFFICE VISIT (OUTPATIENT)
Dept: NEUROSURGERY | Age: 67
End: 2022-02-17
Payer: COMMERCIAL

## 2022-02-17 ENCOUNTER — HOSPITAL ENCOUNTER (OUTPATIENT)
Dept: MRI IMAGING | Age: 67
Discharge: HOME | End: 2022-02-19
Payer: COMMERCIAL

## 2022-02-17 VITALS
SYSTOLIC BLOOD PRESSURE: 140 MMHG | DIASTOLIC BLOOD PRESSURE: 55 MMHG | WEIGHT: 111 LBS | OXYGEN SATURATION: 97 % | RESPIRATION RATE: 16 BRPM | HEIGHT: 61 IN | HEART RATE: 64 BPM | TEMPERATURE: 98.1 F | BODY MASS INDEX: 20.96 KG/M2

## 2022-02-17 DIAGNOSIS — I61.0 NONTRAUMATIC SUBCORTICAL HEMORRHAGE OF CEREBRAL HEMISPHERE, UNSPECIFIED LATERALITY (HCC): Primary | ICD-10-CM

## 2022-02-17 DIAGNOSIS — R51.9 NONINTRACTABLE HEADACHE, UNSPECIFIED CHRONICITY PATTERN, UNSPECIFIED HEADACHE TYPE: ICD-10-CM

## 2022-02-17 PROCEDURE — 6360000004 HC RX CONTRAST MEDICATION: Performed by: RADIOLOGY

## 2022-02-17 PROCEDURE — A9579 GAD-BASE MR CONTRAST NOS,1ML: HCPCS | Performed by: RADIOLOGY

## 2022-02-17 PROCEDURE — 99213 OFFICE O/P EST LOW 20 MIN: CPT | Performed by: NEUROLOGICAL SURGERY

## 2022-02-17 PROCEDURE — 70553 MRI BRAIN STEM W/O & W/DYE: CPT

## 2022-02-17 RX ADMIN — GADOTERIDOL 12 ML: 279.3 INJECTION, SOLUTION INTRAVENOUS at 09:22

## 2022-02-17 NOTE — PROGRESS NOTES
[facility] NEUROSURGERY  [facility] [place] [phone]       Chief Complaint:   Chief Complaint   Patient presents with    Follow-up     1 month hospital follow up, headaches possibly just from sinus and allergies       HPI:     I had the pleasure of seeing Acndido Manuel today in neurosurgical clinic. As you know, this delightful right handed 74year old woman presents after being seen and evaluated in hospital for left frontal contusion, SDH and tSAH. She continues to deny seizures but endorses intermittent headache. History reviewed. No pertinent past medical history. History reviewed. No pertinent surgical history. History reviewed. No pertinent family history. Social History     Socioeconomic History    Marital status:      Spouse name: Not on file    Number of children: Not on file    Years of education: Not on file    Highest education level: Not on file   Occupational History    Not on file   Tobacco Use    Smoking status: Never Smoker    Smokeless tobacco: Never Used   Vaping Use    Vaping Use: Never used   Substance and Sexual Activity    Alcohol use: Not Currently    Drug use: Not Currently    Sexual activity: Not Currently   Other Topics Concern    Not on file   Social History Narrative     for 20 years. 3 children. A boy and girl in town and one boy out of town    Boyfriend of 10 years  suddenly in 2019 well on her couch at age 45. 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     Hyperlipidemia    Hypertension    Hypothyroidism    GERD    Neuropathy both feet    Pt refuses colonoscopy    --cologurad given   10-20    Right foot fracture  10-20  dr pabon    Admit   with subarachnoid hem  [facility]     dr Kavya Elliott LFT   2-22     Social Determinants of Health     Financial Resource Strain:     Difficulty of Paying Living Expenses: Not on file   Food Insecurity:     Worried About Running Out of Food in the Last Year: Not on file    Ashley of Food in the Last Year: Not on file   Transportation Needs:     Lack of Transportation (Medical): Not on file    Lack of Transportation (Non-Medical): Not on file   Physical Activity:     Days of Exercise per Week: Not on file    Minutes of Exercise per Session: Not on file   Stress:     Feeling of Stress : Not on file   Social Connections:     Frequency of Communication with Friends and Family: Not on file    Frequency of Social Gatherings with Friends and Family: Not on file    Attends Mosque Services: Not on file    Active Member of [address] [place] [address] or Organizations: Not on file    Attends Club or Organization Meetings: Not on file    Marital Status: Not on file   Intimate Partner Violence:     Fear of Current or Ex-Partner: Not on file    Emotionally Abused: Not on file    Physically Abused: Not on file    Sexually Abused: Not on file   Housing Stability:     Unable to Pay for Housing in the Last Year: Not on file    Number of [place] in the Last Year: Not on file    Unstable Housing in the Last Year: Not on file       Medications:   Current Outpatient Medications   Medication Sig Dispense Refill    levothyroxine (SYNTHROID) 25 MCG tablet Take 1 tablet by mouth Daily 90 tablet 3    atenolol (TENORMIN) 50 MG tablet Take 1 tablet by mouth daily 90 tablet 3    omeprazole (PRILOSEC) 40 MG delayed release capsule Take 1 capsule by mouth every morning (before breakfast) 90 capsule 3    diclofenac sodium (VOLTAREN) 1 % GEL Apply 4 g topically 2 times daily 350 g 0    atorvastatin (LIPITOR) 20 MG tablet Take 1 tablet by mouth daily 90 tablet 5     No current facility-administered medications for this visit.         Allergies:    Sulfa antibiotics       Review of Systems:    Denies any chest pain, dyspnea, recent weight loss, fevers, chills or night sweats. Physical Examination:    BP (!) 140/55   Pulse 64   Temp 98.1 °F (36.7 °C)   Resp 16   Ht 5' 1\" (1.549 m)   Wt 111 lb (50.3 kg)   LMP  (LMP Unknown)   SpO2 97%   BMI 20.97 kg/m²    On focused neurological examination, she  is awake alert oriented and rationally conversant. Speech is clear and fluent. Pupils are equal and reactive to light bilaterally, extraocular movements are intact, visual fields are full to confrontation. Her  face is symmetric and grossly intact to fine touch. Uvula and tongue are both midline. Shoulder shrug is symmetric and strong. Cervical spine is well aligned and nontender to direct palpation. She  can forward flex, extend , laterally rotate and laterally extend without limitation or pain. Motor examination reveals preserved power in the upper and lower extremities at 5 out of 5 throughout. Reflexes are symmetric and brisk. Plantar responses are downgoing. There is no clonus. Patient is intact to fine touch in all dermatomes throughout. No drift. ASSESSMENT:    There are no new images for my review. 1. Nontraumatic subcortical hemorrhage of cerebral hemisphere, unspecified laterality ([facility]  -     CT HEAD WO CONTRAST; Future  2. Nonintractable headache, unspecified chronicity pattern, unspecified headache type  -     Fuad Marcus MD, Pain Medicine, [facility], [address]:    I showed the patient her images and explained the findings. We have ordered another CT head for followup and offered referral to pain management at the patient's request.  We will see her in one month with the above results in hand, unless new neurosurgical issues arise prior. Thank you so much for allowing us to participate in the care of this patient. No follow-ups on file.       Electronically signed by Evelin Smith Epifanio Jeans, MD on 2/27/2022 at 7:03 PM       NOTE: This report was transcribed using voice recognition software.  Every effort was made to ensure accuracy; however, inadvertent computerized transcription errors may be present

## 2022-02-21 ENCOUNTER — OFFICE VISIT (OUTPATIENT)
Dept: PRIMARY CARE CLINIC | Age: 67
End: 2022-02-21
Payer: COMMERCIAL

## 2022-02-21 VITALS
TEMPERATURE: 97.2 F | DIASTOLIC BLOOD PRESSURE: 78 MMHG | WEIGHT: 112 LBS | SYSTOLIC BLOOD PRESSURE: 126 MMHG | BODY MASS INDEX: 21.16 KG/M2

## 2022-02-21 DIAGNOSIS — E78.2 MIXED HYPERLIPIDEMIA: ICD-10-CM

## 2022-02-21 DIAGNOSIS — R79.89 ELEVATED LIVER FUNCTION TESTS: Chronic | ICD-10-CM

## 2022-02-21 DIAGNOSIS — I10 ESSENTIAL HYPERTENSION: Primary | Chronic | ICD-10-CM

## 2022-02-21 DIAGNOSIS — I60.9 SAH (SUBARACHNOID HEMORRHAGE) (HCC): ICD-10-CM

## 2022-02-21 DIAGNOSIS — E03.9 ACQUIRED HYPOTHYROIDISM: Chronic | ICD-10-CM

## 2022-02-21 PROCEDURE — 99214 OFFICE O/P EST MOD 30 MIN: CPT | Performed by: FAMILY MEDICINE

## 2022-02-21 RX ORDER — ATORVASTATIN CALCIUM 20 MG/1
20 TABLET, FILM COATED ORAL DAILY
Qty: 90 TABLET | Refills: 5 | Status: SHIPPED
Start: 2022-02-21 | End: 2022-03-28 | Stop reason: SDUPTHER

## 2022-02-21 ASSESSMENT — PATIENT HEALTH QUESTIONNAIRE - PHQ9
SUM OF ALL RESPONSES TO PHQ QUESTIONS 1-9: 0
SUM OF ALL RESPONSES TO PHQ9 QUESTIONS 1 & 2: 0
SUM OF ALL RESPONSES TO PHQ QUESTIONS 1-9: 0
1. LITTLE INTEREST OR PLEASURE IN DOING THINGS: 0
SUM OF ALL RESPONSES TO PHQ QUESTIONS 1-9: 0
SUM OF ALL RESPONSES TO PHQ QUESTIONS 1-9: 0
2. FEELING DOWN, DEPRESSED OR HOPELESS: 0

## 2022-02-21 ASSESSMENT — ENCOUNTER SYMPTOMS
GASTROINTESTINAL NEGATIVE: 1
ALLERGIC/IMMUNOLOGIC NEGATIVE: 1
EYES NEGATIVE: 1
RESPIRATORY NEGATIVE: 1

## 2022-02-21 NOTE — PROGRESS NOTES
for 20 years. 3 children. A boy and girl in town and one boy out of town    Boyfriend of 10 years  suddenly in 2019 well on her couch at age 45. 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 Hyperlipidemia    Hypertension    Hypothyroidism    GERD    Neuropathy both feet    Pt refuses colonoscopy    --cologurad given   10-20    Right foot fracture  10-20  dr pabon    Admit   with subarachnoid hem  IC  hem     dr Elisa Traore LFT        Social Determinants of Health     Financial Resource Strain:     Difficulty of Paying Living Expenses: Not on file   Food Insecurity:     Worried About Running Out of Food in the Last Year: Not on file    Ashley of Food in the Last Year: Not on file   Transportation Needs:     Lack of Transportation (Medical): Not on file    Lack of Transportation (Non-Medical): Not on file   Physical Activity:     Days of Exercise per Week: Not on file    Minutes of Exercise per Session: Not on file   Stress:     Feeling of Stress : Not on file   Social Connections:     Frequency of Communication with Friends and Family: Not on file    Frequency of Social Gatherings with Friends and Family: Not on file    Attends Taoism Services: Not on file    Active Member of [address] [place] [address] or Organizations: Not on file    Attends Club or Organization Meetings: Not on file    Marital Status: Not on file   Intimate Partner Violence:     Fear of Current or Ex-Partner: Not on file    Emotionally Abused: Not on file    Physically Abused: Not on file    Sexually Abused: Not on file   Housing Stability:     Unable to Pay for Housing in the Last Year: Not on file    Number of [place] in the Last Year: Not on file    Unstable Housing in the Last Year: Not on file      No past medical history on file.   No family history on file. No past surgical history on file. Vitals:    02/21/22 1435   BP: 126/78   Temp: 97.2 °F (36.2 °C)   TempSrc: Infrared   Weight: 112 lb (50.8 kg)       Objective:    Physical Exam  Vitals reviewed. Constitutional:       Appearance: She is well-developed. HENT:      Head: Normocephalic. Eyes:      Pupils: Pupils are equal, round, and reactive to light. Cardiovascular:      Rate and Rhythm: Normal rate and regular rhythm. Pulmonary:      Effort: Pulmonary effort is normal.      Breath sounds: Normal breath sounds. Abdominal:      Palpations: Abdomen is soft. Musculoskeletal:         General: Normal range of motion. Cervical back: Normal range of motion. Skin:     General: Skin is warm. Neurological:      Mental Status: She is alert and oriented to person, place, and time. Psychiatric:         Behavior: Behavior normal.         Madan Keller was seen today for discuss labs. Diagnoses and all orders for this visit:    Essential hypertension  -     Comprehensive Metabolic Panel; Future  -     CBC with Auto Differential; Future  -     Lipid Panel; Future    Mixed hyperlipidemia  -     atorvastatin (LIPITOR) 20 MG tablet; Take 1 tablet by mouth daily  -     Comprehensive Metabolic Panel; Future  -     CBC with Auto Differential; Future  -     Lipid Panel; Future    Acquired hypothyroidism    SAH (subarachnoid hemorrhage) (HCC)    Elevated liver function tests        Comments: inc liptio  20 mg  Qd  alexa bean       I educated the patient about all medications. Make sure they were correct and educated  on the risk associated with missing meds or taking them incorrectly. A list of medications is being sent home with patient today. Check blood pressure at home twice a day. Low-salt low caffeine diet. Call if systolic blood pressure is above 198 and diastolic blood pressures above 85. Only use a upper arm digital cuff. Aggressive low-fat diet.   Avoid red meats, greasy fried foods, dairy products. Avoid processed foods. Take cholesterol medications without food. I informed patient about the risk associated with noncompliance of medication and taking medications incorrectly. Appropriate follow-up with myself and all specialist.  Encourage family members to take active role in assisting with medications and medical care. If any confusion should develop to notify my office immediately to avoid risk of worsening medical condition    -Advised patient to take thyroid med on an empty stomach at least 30 minutes before breakfast and without combination of other medications.  -Patient was advised if she were to take calcium or iron supplementation to wait at least 4 hours after Synthroid to take medication  -Counseled on symptoms of hypo-/hyperthyroidism  -Patient verbalized understanding          Follow Up: Return in about 3 months (around 5/21/2022) for Lab Before---rtw monday.      Seen by:  Kane Moralez DO

## 2022-03-22 ENCOUNTER — HOSPITAL ENCOUNTER (OUTPATIENT)
Dept: CT IMAGING | Age: 67
Discharge: HOME | End: 2022-03-24
Payer: COMMERCIAL

## 2022-03-22 DIAGNOSIS — I61.0 NONTRAUMATIC SUBCORTICAL HEMORRHAGE OF CEREBRAL HEMISPHERE, UNSPECIFIED LATERALITY (HCC): ICD-10-CM

## 2022-03-22 PROCEDURE — 70450 CT HEAD/BRAIN W/O DYE: CPT

## 2022-03-28 DIAGNOSIS — E78.2 MIXED HYPERLIPIDEMIA: ICD-10-CM

## 2022-03-28 RX ORDER — ATORVASTATIN CALCIUM 20 MG/1
20 TABLET, FILM COATED ORAL DAILY
Qty: 90 TABLET | Refills: 3 | Status: SHIPPED | OUTPATIENT
Start: 2022-03-28

## 2022-03-28 RX ORDER — ATORVASTATIN CALCIUM 20 MG/1
20 TABLET, FILM COATED ORAL DAILY
Qty: 14 TABLET | Refills: 0 | Status: SHIPPED | OUTPATIENT
Start: 2022-03-28

## 2022-05-18 DIAGNOSIS — E78.2 MIXED HYPERLIPIDEMIA: ICD-10-CM

## 2022-05-18 DIAGNOSIS — I10 ESSENTIAL HYPERTENSION: Chronic | ICD-10-CM

## 2022-05-18 LAB
ALBUMIN SERPL-MCNC: 4.9 G/DL (ref 3.5–5.2)
ALP BLD-CCNC: 74 U/L (ref 35–104)
ALT SERPL-CCNC: 40 U/L (ref 0–32)
ANION GAP SERPL CALCULATED.3IONS-SCNC: 18 MMOL/L (ref 7–16)
AST SERPL-CCNC: 41 U/L (ref 0–31)
BASOPHILS ABSOLUTE: 0.03 E9/L (ref 0–0.2)
BASOPHILS RELATIVE PERCENT: 0.8 % (ref 0–2)
BILIRUB SERPL-MCNC: 0.7 MG/DL (ref 0–1.2)
BUN BLDV-MCNC: 10 MG/DL (ref 6–23)
CALCIUM SERPL-MCNC: 10 MG/DL (ref 8.6–10.2)
CHLORIDE BLD-SCNC: 99 MMOL/L (ref 98–107)
CHOLESTEROL, TOTAL: 228 MG/DL (ref 0–199)
CO2: 22 MMOL/L (ref 22–29)
CREAT SERPL-MCNC: 0.6 MG/DL (ref 0.5–1)
EOSINOPHILS ABSOLUTE: 0.03 E9/L (ref 0.05–0.5)
EOSINOPHILS RELATIVE PERCENT: 0.8 % (ref 0–6)
GFR AFRICAN AMERICAN: >60
GFR NON-AFRICAN AMERICAN: >60 ML/MIN/1.73
GLUCOSE BLD-MCNC: 93 MG/DL (ref 74–99)
HCT VFR BLD CALC: 41.4 % (ref 34–48)
HDLC SERPL-MCNC: 98 MG/DL
HEMOGLOBIN: 13.8 G/DL (ref 11.5–15.5)
IMMATURE GRANULOCYTES #: 0.01 E9/L
IMMATURE GRANULOCYTES %: 0.3 % (ref 0–5)
LDL CHOLESTEROL CALCULATED: 118 MG/DL (ref 0–99)
LYMPHOCYTES ABSOLUTE: 1.63 E9/L (ref 1.5–4)
LYMPHOCYTES RELATIVE PERCENT: 42.3 % (ref 20–42)
MCH RBC QN AUTO: 31.9 PG (ref 26–35)
MCHC RBC AUTO-ENTMCNC: 33.3 % (ref 32–34.5)
MCV RBC AUTO: 95.8 FL (ref 80–99.9)
MONOCYTES ABSOLUTE: 0.45 E9/L (ref 0.1–0.95)
MONOCYTES RELATIVE PERCENT: 11.7 % (ref 2–12)
NEUTROPHILS ABSOLUTE: 1.7 E9/L (ref 1.8–7.3)
NEUTROPHILS RELATIVE PERCENT: 44.1 % (ref 43–80)
PDW BLD-RTO: 12.3 FL (ref 11.5–15)
PLATELET # BLD: 192 E9/L (ref 130–450)
PMV BLD AUTO: 10.3 FL (ref 7–12)
POTASSIUM SERPL-SCNC: 4.5 MMOL/L (ref 3.5–5)
RBC # BLD: 4.32 E12/L (ref 3.5–5.5)
SODIUM BLD-SCNC: 139 MMOL/L (ref 132–146)
TOTAL PROTEIN: 7.5 G/DL (ref 6.4–8.3)
TRIGL SERPL-MCNC: 60 MG/DL (ref 0–149)
VLDLC SERPL CALC-MCNC: 12 MG/DL
WBC # BLD: 3.9 E9/L (ref 4.5–11.5)

## 2022-05-25 ENCOUNTER — OFFICE VISIT (OUTPATIENT)
Dept: PRIMARY CARE CLINIC | Age: 67
End: 2022-05-25
Payer: COMMERCIAL

## 2022-05-25 VITALS
DIASTOLIC BLOOD PRESSURE: 78 MMHG | TEMPERATURE: 97.2 F | WEIGHT: 119 LBS | SYSTOLIC BLOOD PRESSURE: 125 MMHG | BODY MASS INDEX: 22.48 KG/M2

## 2022-05-25 DIAGNOSIS — E11.9 DIET-CONTROLLED DIABETES MELLITUS (HCC): ICD-10-CM

## 2022-05-25 DIAGNOSIS — R79.89 ELEVATED LIVER FUNCTION TESTS: Chronic | ICD-10-CM

## 2022-05-25 DIAGNOSIS — G57.93 NEUROPATHY OF BOTH FEET: Chronic | ICD-10-CM

## 2022-05-25 DIAGNOSIS — E03.9 ACQUIRED HYPOTHYROIDISM: Chronic | ICD-10-CM

## 2022-05-25 DIAGNOSIS — I10 ESSENTIAL HYPERTENSION: Primary | Chronic | ICD-10-CM

## 2022-05-25 DIAGNOSIS — E78.2 MIXED HYPERLIPIDEMIA: Chronic | ICD-10-CM

## 2022-05-25 DIAGNOSIS — I62.9 INTRACRANIAL HEMORRHAGE (HCC): ICD-10-CM

## 2022-05-25 DIAGNOSIS — M81.0 AGE-RELATED OSTEOPOROSIS WITHOUT CURRENT PATHOLOGICAL FRACTURE: ICD-10-CM

## 2022-05-25 PROCEDURE — 3044F HG A1C LEVEL LT 7.0%: CPT | Performed by: FAMILY MEDICINE

## 2022-05-25 PROCEDURE — 99214 OFFICE O/P EST MOD 30 MIN: CPT | Performed by: FAMILY MEDICINE

## 2022-05-25 PROCEDURE — 1123F ACP DISCUSS/DSCN MKR DOCD: CPT | Performed by: FAMILY MEDICINE

## 2022-05-25 ASSESSMENT — PATIENT HEALTH QUESTIONNAIRE - PHQ9
2. FEELING DOWN, DEPRESSED OR HOPELESS: 0
SUM OF ALL RESPONSES TO PHQ QUESTIONS 1-9: 0
SUM OF ALL RESPONSES TO PHQ9 QUESTIONS 1 & 2: 0
SUM OF ALL RESPONSES TO PHQ QUESTIONS 1-9: 0
1. LITTLE INTEREST OR PLEASURE IN DOING THINGS: 0

## 2022-05-25 ASSESSMENT — ENCOUNTER SYMPTOMS
RESPIRATORY NEGATIVE: 1
ALLERGIC/IMMUNOLOGIC NEGATIVE: 1
EYES NEGATIVE: 1
GASTROINTESTINAL NEGATIVE: 1

## 2022-05-25 NOTE — PROGRESS NOTES
22  Name: Juan Matthews    : 1955    Sex: female    Age: 74 y.o. Subjective:  Chief Complaint: Patient is here for   3  m ock  Re   bp  Chol  LFT  Chol       Feel ok  No cp ros ob   Chol   Dec     LFT  better      Review of Systems   Constitutional: Negative. HENT: Negative. Eyes: Negative. Respiratory: Negative. Cardiovascular: Negative. Gastrointestinal: Negative. Endocrine: Negative. Genitourinary: Negative. Musculoskeletal: Negative. Skin: Negative. Allergic/Immunologic: Negative. Neurological: Negative. Hematological: Negative. Psychiatric/Behavioral: Negative. Current Outpatient Medications:     atorvastatin (LIPITOR) 20 MG tablet, Take 1 tablet by mouth daily, Disp: 90 tablet, Rfl: 3    atorvastatin (LIPITOR) 20 MG tablet, Take 1 tablet by mouth daily, Disp: 14 tablet, Rfl: 0    levothyroxine (SYNTHROID) 25 MCG tablet, Take 1 tablet by mouth Daily, Disp: 90 tablet, Rfl: 3    atenolol (TENORMIN) 50 MG tablet, Take 1 tablet by mouth daily, Disp: 90 tablet, Rfl: 3    omeprazole (PRILOSEC) 40 MG delayed release capsule, Take 1 capsule by mouth every morning (before breakfast), Disp: 90 capsule, Rfl: 3    diclofenac sodium (VOLTAREN) 1 % GEL, Apply 4 g topically 2 times daily, Disp: 350 g, Rfl: 0  Allergies   Allergen Reactions    Sulfa Antibiotics      Social History     Socioeconomic History    Marital status:      Spouse name: Not on file    Number of children: Not on file    Years of education: Not on file    Highest education level: Not on file   Occupational History    Not on file   Tobacco Use    Smoking status: Never Smoker    Smokeless tobacco: Never Used   Vaping Use    Vaping Use: Never used   Substance and Sexual Activity    Alcohol use: Not Currently    Drug use: Not Currently    Sexual activity: Not Currently   Other Topics Concern    Not on file   Social History Narrative     for 20 years.     3 children. A boy and girl in town and one boy out of town    Boyfriend of 10 years  suddenly in 2019 well on her couch at age 45. 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 Hyperlipidemia    Hypertension    Hypothyroidism    GERD    Neuropathy both feet    Pt refuses colonoscopy    --cologurad given   10-20    Right foot fracture  10-20  dr pabon    Admit   with subarachnoid hem  IC  hem     dr Javi Tijerina LFT        Social Determinants of Health     Financial Resource Strain:     Difficulty of Paying Living Expenses: Not on file   Food Insecurity:     Worried About Running Out of Food in the Last Year: Not on file    Ashley of Food in the Last Year: Not on file   Transportation Needs:     Lack of Transportation (Medical): Not on file    Lack of Transportation (Non-Medical): Not on file   Physical Activity:     Days of Exercise per Week: Not on file    Minutes of Exercise per Session: Not on file   Stress:     Feeling of Stress : Not on file   Social Connections:     Frequency of Communication with Friends and Family: Not on file    Frequency of Social Gatherings with Friends and Family: Not on file    Attends Denominational Services: Not on file    Active Member of [address] [place] [address] [employer] or Organizations: Not on file    Attends Club or Organization Meetings: Not on file    Marital Status: Not on file   Intimate Partner Violence:     Fear of Current or Ex-Partner: Not on file    Emotionally Abused: Not on file    Physically Abused: Not on file    Sexually Abused: Not on file   Housing Stability:     Unable to Pay for Housing in the Last Year: Not on file    Number of [place] in the Last Year: Not on file    Unstable Housing in the Last Year: Not on file      No past medical history on file. No family history on file. No past surgical history on file. Vitals:    05/25/22 1343   BP: 125/78   Temp: 97.2 °F (36.2 °C)   TempSrc: Infrared   Weight: 119 lb (54 kg)       Objective:    Physical Exam  Vitals reviewed. Constitutional:       Appearance: She is well-developed. HENT:      Head: Normocephalic. Eyes:      Pupils: Pupils are equal, round, and reactive to light. Cardiovascular:      Rate and Rhythm: Normal rate and regular rhythm. Pulmonary:      Effort: Pulmonary effort is normal.      Breath sounds: Normal breath sounds. Abdominal:      Palpations: Abdomen is soft. Musculoskeletal:         General: Normal range of motion. Cervical back: Normal range of motion. Skin:     General: Skin is warm. Neurological:      Mental Status: She is alert and oriented to person, place, and time. Psychiatric:         Behavior: Behavior normal.         Richard Carrillo was seen today for discuss labs. Diagnoses and all orders for this visit:    Essential hypertension    Mixed hyperlipidemia    Neuropathy of both feet    Elevated liver function tests    Acquired hypothyroidism    Intracranial hemorrhage (HCC)        Comments: low  Fat  Diet  exer          I educated the patient about all medications. Make sure they were correct and educated  on the risk associated with missing meds or taking them incorrectly. A list of medications is being sent home with patient today. Check blood pressure at home twice a day. Low-salt low caffeine diet. Call if systolic blood pressure is above 708 and diastolic blood pressures above 85. Only use a upper arm digital cuff. Aggressive low-fat diet. Avoid red meats, greasy fried foods, dairy products. Avoid processed foods. Take cholesterol medications without food. I informed patient about the risk associated with noncompliance of medication and taking medications incorrectly.   Appropriate follow-up with myself and all specialist.  Encourage family members to take active role in assisting with medications and medical care. If any confusion should develop to notify my office immediately to avoid risk of worsening medical condition    A great deal of time spent reviewing medications, diet, exercise, social issues. Also reviewing the chart before entering the room with patient and finishing charting after leaving patient's room. More than half of that time was spent face to face with the patient in counseling and coordinating care. -Advised patient to take thyroid med on an empty stomach at least 30 minutes before breakfast and without combination of other medications.  -Patient was advised if she were to take calcium or iron supplementation to wait at least 4 hours after Synthroid to take medication  -Counseled on symptoms of hypo-/hyperthyroidism  -Patient verbalized understanding      Follow Up: Return in about 6 months (around 11/25/2022) for Lab Before.      Seen by:  Ana Maria De La Garza DO

## 2022-06-23 LAB — MAMMOGRAPHY, EXTERNAL: NORMAL

## 2022-11-01 DIAGNOSIS — E03.9 ACQUIRED HYPOTHYROIDISM: ICD-10-CM

## 2022-11-01 DIAGNOSIS — I10 ESSENTIAL HYPERTENSION: ICD-10-CM

## 2022-11-01 RX ORDER — LEVOTHYROXINE SODIUM 0.03 MG/1
25 TABLET ORAL DAILY
Qty: 90 TABLET | Refills: 3 | Status: SHIPPED | OUTPATIENT
Start: 2022-11-01

## 2022-11-01 RX ORDER — ATENOLOL 50 MG/1
50 TABLET ORAL DAILY
Qty: 90 TABLET | Refills: 3 | Status: SHIPPED | OUTPATIENT
Start: 2022-11-01

## 2023-01-24 DIAGNOSIS — I10 ESSENTIAL HYPERTENSION: Chronic | ICD-10-CM

## 2023-01-24 DIAGNOSIS — E78.2 MIXED HYPERLIPIDEMIA: Chronic | ICD-10-CM

## 2023-01-24 DIAGNOSIS — E11.9 DIET-CONTROLLED DIABETES MELLITUS (HCC): ICD-10-CM

## 2023-01-24 DIAGNOSIS — E03.9 ACQUIRED HYPOTHYROIDISM: Chronic | ICD-10-CM

## 2023-01-24 LAB
BASOPHILS ABSOLUTE: 0.04 E9/L (ref 0–0.2)
BASOPHILS RELATIVE PERCENT: 1.1 % (ref 0–2)
EOSINOPHILS ABSOLUTE: 0.04 E9/L (ref 0.05–0.5)
EOSINOPHILS RELATIVE PERCENT: 1.1 % (ref 0–6)
HBA1C MFR BLD: 5.1 % (ref 4–5.6)
HCT VFR BLD CALC: 42.1 % (ref 34–48)
HEMOGLOBIN: 14 G/DL (ref 11.5–15.5)
IMMATURE GRANULOCYTES #: 0.01 E9/L
IMMATURE GRANULOCYTES %: 0.3 % (ref 0–5)
LYMPHOCYTES ABSOLUTE: 1.6 E9/L (ref 1.5–4)
LYMPHOCYTES RELATIVE PERCENT: 43.6 % (ref 20–42)
MCH RBC QN AUTO: 32.7 PG (ref 26–35)
MCHC RBC AUTO-ENTMCNC: 33.3 % (ref 32–34.5)
MCV RBC AUTO: 98.4 FL (ref 80–99.9)
MONOCYTES ABSOLUTE: 0.41 E9/L (ref 0.1–0.95)
MONOCYTES RELATIVE PERCENT: 11.2 % (ref 2–12)
NEUTROPHILS ABSOLUTE: 1.57 E9/L (ref 1.8–7.3)
NEUTROPHILS RELATIVE PERCENT: 42.7 % (ref 43–80)
PDW BLD-RTO: 12.9 FL (ref 11.5–15)
PLATELET # BLD: 190 E9/L (ref 130–450)
PMV BLD AUTO: 10.2 FL (ref 7–12)
RBC # BLD: 4.28 E12/L (ref 3.5–5.5)
WBC # BLD: 3.7 E9/L (ref 4.5–11.5)

## 2023-01-25 LAB
ALBUMIN SERPL-MCNC: 4.3 G/DL (ref 3.5–5.2)
ALP BLD-CCNC: 75 U/L (ref 35–104)
ALT SERPL-CCNC: 41 U/L (ref 0–32)
ANION GAP SERPL CALCULATED.3IONS-SCNC: 17 MMOL/L (ref 7–16)
AST SERPL-CCNC: 49 U/L (ref 0–31)
BILIRUB SERPL-MCNC: 0.5 MG/DL (ref 0–1.2)
BUN BLDV-MCNC: 10 MG/DL (ref 6–23)
CALCIUM SERPL-MCNC: 9.6 MG/DL (ref 8.6–10.2)
CHLORIDE BLD-SCNC: 97 MMOL/L (ref 98–107)
CHOLESTEROL, TOTAL: 211 MG/DL (ref 0–199)
CO2: 25 MMOL/L (ref 22–29)
CREAT SERPL-MCNC: 0.6 MG/DL (ref 0.5–1)
GFR SERPL CREATININE-BSD FRML MDRD: >60 ML/MIN/1.73
GLUCOSE BLD-MCNC: 73 MG/DL (ref 74–99)
HDLC SERPL-MCNC: 79 MG/DL
LDL CHOLESTEROL CALCULATED: 120 MG/DL (ref 0–99)
POTASSIUM SERPL-SCNC: 4.7 MMOL/L (ref 3.5–5)
SODIUM BLD-SCNC: 139 MMOL/L (ref 132–146)
T4 TOTAL: 6.6 MCG/DL (ref 4.5–11.7)
TOTAL PROTEIN: 7.4 G/DL (ref 6.4–8.3)
TRIGL SERPL-MCNC: 60 MG/DL (ref 0–149)
TSH SERPL DL<=0.05 MIU/L-ACNC: 1.39 UIU/ML (ref 0.27–4.2)
VITAMIN D 25-HYDROXY: 63 NG/ML (ref 30–100)
VLDLC SERPL CALC-MCNC: 12 MG/DL

## 2023-01-30 ENCOUNTER — OFFICE VISIT (OUTPATIENT)
Dept: PRIMARY CARE CLINIC | Age: 68
End: 2023-01-30
Payer: COMMERCIAL

## 2023-01-30 VITALS
TEMPERATURE: 96.8 F | SYSTOLIC BLOOD PRESSURE: 128 MMHG | DIASTOLIC BLOOD PRESSURE: 78 MMHG | HEIGHT: 61 IN | BODY MASS INDEX: 24.43 KG/M2 | WEIGHT: 129.4 LBS

## 2023-01-30 DIAGNOSIS — E11.9 DIET-CONTROLLED DIABETES MELLITUS (HCC): ICD-10-CM

## 2023-01-30 DIAGNOSIS — M81.0 AGE-RELATED OSTEOPOROSIS WITHOUT CURRENT PATHOLOGICAL FRACTURE: ICD-10-CM

## 2023-01-30 DIAGNOSIS — D51.8 VITAMIN B12 DEFICIENCY (DIETARY) ANEMIA: ICD-10-CM

## 2023-01-30 DIAGNOSIS — I10 ESSENTIAL HYPERTENSION: ICD-10-CM

## 2023-01-30 DIAGNOSIS — Z00.00 INITIAL MEDICARE ANNUAL WELLNESS VISIT: Primary | ICD-10-CM

## 2023-01-30 DIAGNOSIS — E03.9 ACQUIRED HYPOTHYROIDISM: ICD-10-CM

## 2023-01-30 DIAGNOSIS — E78.2 MIXED HYPERLIPIDEMIA: ICD-10-CM

## 2023-01-30 PROCEDURE — 3044F HG A1C LEVEL LT 7.0%: CPT | Performed by: FAMILY MEDICINE

## 2023-01-30 PROCEDURE — G0438 PPPS, INITIAL VISIT: HCPCS | Performed by: FAMILY MEDICINE

## 2023-01-30 PROCEDURE — 3078F DIAST BP <80 MM HG: CPT | Performed by: FAMILY MEDICINE

## 2023-01-30 PROCEDURE — 1123F ACP DISCUSS/DSCN MKR DOCD: CPT | Performed by: FAMILY MEDICINE

## 2023-01-30 PROCEDURE — 3074F SYST BP LT 130 MM HG: CPT | Performed by: FAMILY MEDICINE

## 2023-01-30 RX ORDER — ATORVASTATIN CALCIUM 20 MG/1
20 TABLET, FILM COATED ORAL DAILY
Qty: 90 TABLET | Refills: 3 | Status: SHIPPED | OUTPATIENT
Start: 2023-01-30

## 2023-01-30 RX ORDER — ATENOLOL 50 MG/1
50 TABLET ORAL DAILY
Qty: 90 TABLET | Refills: 3 | Status: SHIPPED | OUTPATIENT
Start: 2023-01-30

## 2023-01-30 RX ORDER — LEVOTHYROXINE SODIUM 0.03 MG/1
25 TABLET ORAL DAILY
Qty: 90 TABLET | Refills: 3 | Status: SHIPPED | OUTPATIENT
Start: 2023-01-30

## 2023-01-30 ASSESSMENT — PATIENT HEALTH QUESTIONNAIRE - PHQ9
SUM OF ALL RESPONSES TO PHQ QUESTIONS 1-9: 0
2. FEELING DOWN, DEPRESSED OR HOPELESS: 0
SUM OF ALL RESPONSES TO PHQ QUESTIONS 1-9: 0
SUM OF ALL RESPONSES TO PHQ QUESTIONS 1-9: 0
1. LITTLE INTEREST OR PLEASURE IN DOING THINGS: 0
SUM OF ALL RESPONSES TO PHQ9 QUESTIONS 1 & 2: 0
SUM OF ALL RESPONSES TO PHQ QUESTIONS 1-9: 0

## 2023-01-30 ASSESSMENT — LIFESTYLE VARIABLES: HOW MANY STANDARD DRINKS CONTAINING ALCOHOL DO YOU HAVE ON A TYPICAL DAY: PATIENT DOES NOT DRINK

## 2023-01-30 NOTE — PATIENT INSTRUCTIONS
Advance Directives: Care Instructions  Overview  An advance directive is a legal way to state your wishes at the end of your life. It tells your family and your doctor what to do if you can't say what you want. There are two main types of advance directives. You can change them any time your wishes change. Living will. This form tells your family and your doctor your wishes about life support and other treatment. The form is also called a declaration. Medical power of . This form lets you name a person to make treatment decisions for you when you can't speak for yourself. This person is called a health care agent (health care proxy, health care surrogate). The form is also called a durable power of  for health care. If you do not have an advance directive, decisions about your medical care may be made by a family member, or by a doctor or a  who doesn't know you. It may help to think of an advance directive as a gift to the people who care for you. If you have one, they won't have to make tough decisions by themselves. For more information, including forms for your state, see the [address] website (www.caringinfo.org/planning/advance-directives/). Follow-up care is a key part of your treatment and safety. Be sure to make and go to all appointments, and call your doctor if you are having problems. It's also a good idea to know your test results and keep a list of the medicines you take. What should you include in an advance directive? Many states have a unique advance directive form. (It may ask you to address specific issues.) Or you might use a universal form that's approved by many states. If your form doesn't tell you what to address, it may be hard to know what to include in your advance directive. Use the questions below to help you get started. Who do you want to make decisions about your medical care if you are not able to?   What life-support measures do you want if you have a serious illness that gets worse over time or can't be cured? What are you most afraid of that might happen? (Maybe you're afraid of having pain, losing your independence, or being kept alive by machines.)  Where would you prefer to die? (Your home? A hospital? A nursing home?)  Do you want to donate your organs when you die? Do you want certain Muslim practices performed before you die? When should you call for help? Be sure to contact your doctor if you have any questions. Where can you learn more? Go to http://www.land.com/ and enter R264 to learn more about \"Advance Directives: Care Instructions. \"  Current as of: June 16, 2022               Content Version: 13.5  © 0598-5284 Healthwise, Incorporated. Care instructions adapted under license by [facility] ([facility]). If you have questions about a medical condition or this instruction, always ask your healthcare professional. [place] [address] any warranty or liability for your use of this information. Personalized Preventive Plan for Keeley Limon - 1/30/2023  Medicare offers a range of preventive health benefits. Some of the tests and screenings are paid in full while other may be subject to a deductible, co-insurance, and/or copay. Some of these benefits include a comprehensive review of your medical history including lifestyle, illnesses that may run in your family, and various assessments and screenings as appropriate. After reviewing your medical record and screening and assessments performed today your provider may have ordered immunizations, labs, imaging, and/or referrals for you. A list of these orders (if applicable) as well as your Preventive Care list are included within your After Visit Summary for your review.     Other Preventive Recommendations:    A preventive eye exam performed by an eye specialist is recommended every 1-2 years to screen for glaucoma; cataracts, macular degeneration, and other eye disorders. A preventive dental visit is recommended every 6 months. Try to get at least 150 minutes of exercise per week or 10,000 steps per day on a pedometer . Order or download the FREE \"Exercise & Physical Activity: Your Everyday Guide\" from [employer]. Call 0-140.361.2481 or search [employer] online. You need 4959-6843 mg of calcium and 0213-2303 IU of vitamin D per day. It is possible to meet your calcium requirement with diet alone, but a vitamin D supplement is usually necessary to meet this goal.  When exposed to the sun, use a sunscreen that protects against both UVA and UVB radiation with an SPF of 30 or greater. Reapply every 2 to 3 hours or after sweating, drying off with a towel, or swimming. Always wear a seat belt when traveling in a car. Always wear a helmet when riding a bicycle or motorcycle.

## 2023-01-30 NOTE — PROGRESS NOTES
Medicare Annual Wellness Visit    Radha Jerome is here for Medicare AWV    Assessment & Plan   Initial Medicare annual wellness visit  Acquired hypothyroidism  -     levothyroxine (SYNTHROID) 25 MCG tablet; Take 1 tablet by mouth Daily, Disp-90 tablet, R-3Normal  -     T4; Future  -     TSH; Future  Essential hypertension  -     atenolol (TENORMIN) 50 MG tablet; Take 1 tablet by mouth daily, Disp-90 tablet, R-3Normal  -     CBC with Auto Differential; Future  -     Comprehensive Metabolic Panel; Future  -     Hemoglobin A1C; Future  -     Lipid Panel; Future  -     T4; Future  -     TSH; Future  -     Urinalysis; Future  -     Vitamin D 25 Hydroxy; Future  -     Vitamin B12; Future  Mixed hyperlipidemia  -     atorvastatin (LIPITOR) 20 MG tablet; Take 1 tablet by mouth daily, Disp-90 tablet, R-3Normal  -     CBC with Auto Differential; Future  -     Comprehensive Metabolic Panel; Future  -     Hemoglobin A1C; Future  -     Lipid Panel; Future  -     T4; Future  -     TSH; Future  -     Urinalysis; Future  -     Vitamin D 25 Hydroxy; Future  -     Vitamin B12; Future  Diet-controlled diabetes mellitus (HCC)  -     Hemoglobin A1C; Future  Age-related osteoporosis without current pathological fracture  -     Vitamin D 25 Hydroxy; Future  Vitamin B12 deficiency (dietary) anemia  -     Vitamin B12; Future    Recommendations for Preventive Services Due: see orders and patient instructions/AVS.  Recommended screening schedule for the next 5-10 years is provided to the patient in written form: see Patient Instructions/AVS.     Return for Lab Before. Subjective   The following acute and/or chronic problems were also addressed today:  6  mo ck  re   bp chol LFT    gerd        Feel ok  she  dc prisleoc  and ok    no cp ro sob      alb wit h LFt same    chol      down    diet  failr    Patient's complete Health Risk Assessment and screening values have been reviewed and are found in Flowsheets.  The following problems were reviewed today and where indicated follow up appointments were made and/or referrals ordered. Positive Risk Factor Screenings with Interventions:                 Weight and Activity:  Physical Activity: Inactive    Days of Exercise per Week: 0 days    Minutes of Exercise per Session: 0 min     On average, how many days per week do you engage in moderate to strenuous exercise (like a brisk walk)?: 0 days  Have you lost any weight without trying in the past 3 months?: No  Body mass index: 24.45    Inactivity Interventions:  Patient declined any further interventions or treatment                           Objective   Vitals:    01/30/23 1345   BP: 128/78   Temp: 96.8 °F (36 °C)   Weight: 129 lb 6.4 oz (58.7 kg)   Height: 5' 1\" (1.549 m)      Body mass index is 24.45 kg/m².       General Appearance: alert and oriented to person, place and time, well developed and well- nourished, in no acute distress  Skin: warm and dry, no rash or erythema  Head: normocephalic and atraumatic  Eyes: pupils equal, round, and reactive to light, extraocular eye movements intact, conjunctivae normal  ENT: tympanic membrane, external ear and ear canal normal bilaterally, nose without deformity, nasal mucosa and turbinates normal without polyps  Neck: supple and non-tender without mass, no thyromegaly or thyroid nodules, no cervical lymphadenopathy  Pulmonary/Chest: clear to auscultation bilaterally- no wheezes, rales or rhonchi, normal air movement, no respiratory distress  Cardiovascular: normal rate, regular rhythm, normal S1 and S2, no murmurs, rubs, clicks, or gallops, distal pulses intact, no carotid bruits  Abdomen: soft, non-tender, non-distended, normal bowel sounds, no masses or organomegaly  Extremities: no cyanosis, clubbing or edema  Musculoskeletal: normal range of motion, no joint swelling, deformity or tenderness  Neurologic: reflexes normal and symmetric, no cranial nerve deficit, gait, coordination and speech normal Allergies   Allergen Reactions    Sulfa Antibiotics      Prior to Visit Medications    Medication Sig Taking? Authorizing Provider   levothyroxine (SYNTHROID) 25 MCG tablet Take 1 tablet by mouth Daily Yes David Clayton DO   atenolol (TENORMIN) 50 MG tablet Take 1 tablet by mouth daily Yes David Clayton DO   atorvastatin (LIPITOR) 20 MG tablet Take 1 tablet by mouth daily Yes David Clayton DO       CareTeam (Including outside providers/suppliers regularly involved in providing care):   Patient Care Team:  Rhonda Elliott DO as PCP - General (Family Medicine)  Rhonda Elliott DO as PCP - [facility] Empaneled Provider     Reviewed and updated this visit:  Tobacco  Allergies  Med Hx  Surg Hx  Soc Hx  Fam Hx           Diet exer hm issues   lose wt exer        I educated the patient about all medications. Make sure they were correct and educated  on the risk associated with missing meds or taking them incorrectly. A list of medications is being sent home with patient today. Check blood pressure at home twice a day. Low-salt low caffeine diet. Call if systolic blood pressure is above 628 and diastolic blood pressures above 85. Only use a upper arm digital cuff. Aggressive low-fat diet. Avoid red meats, greasy fried foods, dairy products. Avoid processed foods. Take cholesterol medications without food. A great deal of time spent reviewing medications, diet, exercise, social issues. Also reviewing the chart before entering the room with patient and finishing charting after leaving patient's room. More than half of that time was spent face to face with the patient in counseling and coordinating care. I informed patient about the risk associated with noncompliance of medication and taking medications incorrectly. Appropriate follow-up with myself and all specialist.  Encourage family members to take active role in assisting with medications and medical care.   If any confusion should develop to notify my office immediately to avoid risk of worsening medical condition

## 2023-10-10 ENCOUNTER — TELEPHONE (OUTPATIENT)
Dept: PRIMARY CARE CLINIC | Age: 68
End: 2023-10-10

## 2023-10-10 NOTE — TELEPHONE ENCOUNTER
----- Message from Winston Persaud sent at 10/10/2023  2:06 PM EDT -----  Subject: Message to Provider    QUESTIONS  Information for Provider? Patient is inquiring if she will need labs prior   to AWV Jan 31, 2024  ---------------------------------------------------------------------------  --------------  Melinda García INFO  [phone]; OK to leave message on voicemail  ---------------------------------------------------------------------------  --------------  SCRIPT ANSWERS  Relationship to Patient?  Self

## 2023-10-29 ENCOUNTER — HOSPITAL ENCOUNTER (OUTPATIENT)
Dept: GENERAL RADIOLOGY | Age: 68
Discharge: HOME | End: 2023-10-31
Payer: COMMERCIAL

## 2023-10-29 ENCOUNTER — OFFICE VISIT (OUTPATIENT)
Dept: FAMILY MEDICINE CLINIC | Age: 68
End: 2023-10-29
Payer: COMMERCIAL

## 2023-10-29 ENCOUNTER — HOSPITAL ENCOUNTER (OUTPATIENT)
Age: 68
Discharge: HOME | End: 2023-10-31
Payer: COMMERCIAL

## 2023-10-29 VITALS
TEMPERATURE: 97 F | WEIGHT: 129 LBS | HEART RATE: 74 BPM | BODY MASS INDEX: 24.35 KG/M2 | HEIGHT: 61 IN | SYSTOLIC BLOOD PRESSURE: 124 MMHG | OXYGEN SATURATION: 99 % | DIASTOLIC BLOOD PRESSURE: 72 MMHG

## 2023-10-29 DIAGNOSIS — M25.571 ACUTE RIGHT ANKLE PAIN: ICD-10-CM

## 2023-10-29 DIAGNOSIS — M25.571 ACUTE RIGHT ANKLE PAIN: Primary | ICD-10-CM

## 2023-10-29 PROCEDURE — 73630 X-RAY EXAM OF FOOT: CPT

## 2023-10-29 PROCEDURE — 1123F ACP DISCUSS/DSCN MKR DOCD: CPT | Performed by: STUDENT IN AN ORGANIZED HEALTH CARE EDUCATION/TRAINING PROGRAM

## 2023-10-29 PROCEDURE — 3078F DIAST BP <80 MM HG: CPT | Performed by: STUDENT IN AN ORGANIZED HEALTH CARE EDUCATION/TRAINING PROGRAM

## 2023-10-29 PROCEDURE — 73610 X-RAY EXAM OF ANKLE: CPT

## 2023-10-29 PROCEDURE — 99214 OFFICE O/P EST MOD 30 MIN: CPT | Performed by: STUDENT IN AN ORGANIZED HEALTH CARE EDUCATION/TRAINING PROGRAM

## 2023-10-29 PROCEDURE — 3074F SYST BP LT 130 MM HG: CPT | Performed by: STUDENT IN AN ORGANIZED HEALTH CARE EDUCATION/TRAINING PROGRAM

## 2023-10-29 RX ORDER — METHYLPREDNISOLONE 4 MG/1
TABLET ORAL
Qty: 1 KIT | Refills: 0 | Status: SHIPPED | OUTPATIENT
Start: 2023-10-29

## 2023-10-29 SDOH — ECONOMIC STABILITY: HOUSING INSECURITY
IN THE LAST 12 MONTHS, WAS THERE A TIME WHEN YOU DID NOT HAVE A STEADY PLACE TO SLEEP OR SLEPT IN A SHELTER (INCLUDING NOW)?: NO

## 2023-10-29 SDOH — ECONOMIC STABILITY: INCOME INSECURITY: HOW HARD IS IT FOR YOU TO PAY FOR THE VERY BASICS LIKE FOOD, HOUSING, MEDICAL CARE, AND HEATING?: NOT HARD AT ALL

## 2023-10-29 SDOH — ECONOMIC STABILITY: FOOD INSECURITY: WITHIN THE PAST 12 MONTHS, YOU WORRIED THAT YOUR FOOD WOULD RUN OUT BEFORE YOU GOT MONEY TO BUY MORE.: NEVER TRUE

## 2023-10-29 SDOH — ECONOMIC STABILITY: FOOD INSECURITY: WITHIN THE PAST 12 MONTHS, THE FOOD YOU BOUGHT JUST DIDN'T LAST AND YOU DIDN'T HAVE MONEY TO GET MORE.: NEVER TRUE

## 2023-10-30 ENCOUNTER — TELEPHONE (OUTPATIENT)
Dept: FAMILY MEDICINE CLINIC | Age: 68
End: 2023-10-30

## 2023-10-30 NOTE — TELEPHONE ENCOUNTER
Patient is needing a letter for employer that she was in office yesterday and that she has an injury. Email: [email or web address]    Letter done

## 2023-10-31 ENCOUNTER — TELEPHONE (OUTPATIENT)
Dept: PRIMARY CARE CLINIC | Age: 68
End: 2023-10-31

## 2023-10-31 NOTE — TELEPHONE ENCOUNTER
----- Message from Linda Nieves sent at 10/31/2023  1:53 PM EDT -----  Subject: Results Request    QUESTIONS  Results: X-ray right ankle and foot; Ordered by:  Calin Mayer   Date Performed: 2023-10-29  ---------------------------------------------------------------------------  --------------  Kierra Yuen INFO    [phone]; OK to leave message on voicemail  ---------------------------------------------------------------------------  --------------

## 2023-11-01 ENCOUNTER — TELEPHONE (OUTPATIENT)
Dept: PRIMARY CARE CLINIC | Age: 68
End: 2023-11-01

## 2023-11-01 DIAGNOSIS — S82.831A OTHER CLOSED FRACTURE OF DISTAL END OF RIGHT FIBULA, INITIAL ENCOUNTER: Primary | ICD-10-CM

## 2023-11-01 NOTE — TELEPHONE ENCOUNTER
The pt had an xray of the ankle done over the weekend and it showed Mildly displaced distal fibular fracture, Dr Joselin Richardson told her to contact her PCP to see if you wanted her to see someone for it

## 2023-11-15 ENCOUNTER — OFFICE VISIT (OUTPATIENT)
Dept: PODIATRY | Age: 68
End: 2023-11-15
Payer: COMMERCIAL

## 2023-11-15 VITALS — WEIGHT: 129 LBS | BODY MASS INDEX: 24.37 KG/M2 | TEMPERATURE: 98.2 F

## 2023-11-15 DIAGNOSIS — S90.01XA CONTUSION OF RIGHT ANKLE, INITIAL ENCOUNTER: Primary | ICD-10-CM

## 2023-11-15 DIAGNOSIS — S82.831A CLOSED FRACTURE OF DISTAL END OF RIGHT FIBULA, UNSPECIFIED FRACTURE MORPHOLOGY, INITIAL ENCOUNTER: ICD-10-CM

## 2023-11-15 PROCEDURE — 99213 OFFICE O/P EST LOW 20 MIN: CPT | Performed by: PODIATRIST

## 2023-11-15 PROCEDURE — 1123F ACP DISCUSS/DSCN MKR DOCD: CPT | Performed by: PODIATRIST

## 2023-11-15 NOTE — PROGRESS NOTES
11/15/23  Carlos Enrique Iniguez : 1955 Sex: female  Age: 77 y.o. Patient was referred by Naida Armijo DO    Chief Complaint   Patient presents with    Foot Injury     Pt was last seen in  present today injured her Right ankle right foot pt went to ER on 10/29 had xrays right ankle right foot showing a fibular fx right ankle they gave her an ankle brace        SUBJECTIVE this patient is seen today for a new issue patient approximately 6 weeks ago fell at home twisting her right ankle patient was seen in the ER they gave her an ankle stirrup brace. Patient has still  Foot Injury       Review of Systems  Const: Denies constitutional symptoms  Musculo: Denies symptoms other than stated above  Skin: Denies symptoms other than stated above       Current Outpatient Medications:     levothyroxine (SYNTHROID) 25 MCG tablet, Take 1 tablet by mouth Daily, Disp: 90 tablet, Rfl: 3    atenolol (TENORMIN) 50 MG tablet, Take 1 tablet by mouth daily, Disp: 90 tablet, Rfl: 3    atorvastatin (LIPITOR) 20 MG tablet, Take 1 tablet by mouth daily, Disp: 90 tablet, Rfl: 3  Allergies   Allergen Reactions    Sulfa Antibiotics        No past medical history on file. No past surgical history on file. No family history on file. Social History     Socioeconomic History    Marital status:      Spouse name: Not on file    Number of children: Not on file    Years of education: Not on file    Highest education level: Not on file   Occupational History    Not on file   Tobacco Use    Smoking status: Never    Smokeless tobacco: Never   Vaping Use    Vaping Use: Never used   Substance and Sexual Activity    Alcohol use: Not Currently    Drug use: Not Currently    Sexual activity: Not Currently   Other Topics Concern    Not on file   Social History Narrative     for 20 years. 3 children.   A boy and girl in town and one boy out of town    Boyfriend of 10 years  suddenly in 2019 well on her couch at

## 2023-12-06 ENCOUNTER — OFFICE VISIT (OUTPATIENT)
Dept: PODIATRY | Age: 68
End: 2023-12-06
Payer: COMMERCIAL

## 2023-12-06 VITALS — BODY MASS INDEX: 24.37 KG/M2 | TEMPERATURE: 98.2 F | WEIGHT: 129 LBS

## 2023-12-06 DIAGNOSIS — S82.891D CLOSED FRACTURE OF RIGHT ANKLE WITH ROUTINE HEALING, SUBSEQUENT ENCOUNTER: Primary | ICD-10-CM

## 2023-12-06 DIAGNOSIS — S82.831D CLOSED FRACTURE OF DISTAL END OF RIGHT FIBULA WITH ROUTINE HEALING, UNSPECIFIED FRACTURE MORPHOLOGY, SUBSEQUENT ENCOUNTER: ICD-10-CM

## 2023-12-06 PROCEDURE — 99213 OFFICE O/P EST LOW 20 MIN: CPT | Performed by: PODIATRIST

## 2023-12-06 PROCEDURE — 1123F ACP DISCUSS/DSCN MKR DOCD: CPT | Performed by: PODIATRIST

## 2023-12-06 NOTE — PROGRESS NOTES
23  Vale Iniguez : 1955 Sex: female  Age: 77 y.o. Patient was referred by Lori Shirley DO    Chief Complaint   Patient presents with    Foot Injury     Right ankle in cam walker since last visit 11/15/2023       SUBJECTIVE patient is seen today for follow-up of right ankle fracture patient is in a cam walker with limited weightbearing. HPI  Review of Systems  Const: Denies constitutional symptoms  Musculo: Denies symptoms other than stated above  Skin: Denies symptoms other than stated above       Current Outpatient Medications:     levothyroxine (SYNTHROID) 25 MCG tablet, Take 1 tablet by mouth Daily, Disp: 90 tablet, Rfl: 3    atenolol (TENORMIN) 50 MG tablet, Take 1 tablet by mouth daily, Disp: 90 tablet, Rfl: 3    atorvastatin (LIPITOR) 20 MG tablet, Take 1 tablet by mouth daily, Disp: 90 tablet, Rfl: 3  Allergies   Allergen Reactions    Sulfa Antibiotics        No past medical history on file. No past surgical history on file. No family history on file. Social History     Socioeconomic History    Marital status:      Spouse name: Not on file    Number of children: Not on file    Years of education: Not on file    Highest education level: Not on file   Occupational History    Not on file   Tobacco Use    Smoking status: Never    Smokeless tobacco: Never   Vaping Use    Vaping Use: Never used   Substance and Sexual Activity    Alcohol use: Not Currently    Drug use: Not Currently    Sexual activity: Not Currently   Other Topics Concern    Not on file   Social History Narrative     for 20 years. 3 children. A boy and girl in town and one boy out of town    Boyfriend of 10 years  suddenly in 2019 well on her couch at age 45. 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     Hyperlipidemia

## 2023-12-14 DIAGNOSIS — I10 ESSENTIAL HYPERTENSION: ICD-10-CM

## 2023-12-15 RX ORDER — ATENOLOL 50 MG/1
50 TABLET ORAL DAILY
Qty: 90 TABLET | Refills: 3 | Status: SHIPPED | OUTPATIENT
Start: 2023-12-15

## 2024-01-03 ENCOUNTER — OFFICE VISIT (OUTPATIENT)
Dept: PODIATRY | Age: 69
End: 2024-01-03
Payer: COMMERCIAL

## 2024-01-03 VITALS — BODY MASS INDEX: 24.37 KG/M2 | TEMPERATURE: 97 F | WEIGHT: 129 LBS

## 2024-01-03 DIAGNOSIS — S82.891D CLOSED FRACTURE OF RIGHT ANKLE WITH ROUTINE HEALING, SUBSEQUENT ENCOUNTER: Primary | ICD-10-CM

## 2024-01-03 PROCEDURE — 99213 OFFICE O/P EST LOW 20 MIN: CPT | Performed by: PODIATRIST

## 2024-01-03 PROCEDURE — 1123F ACP DISCUSS/DSCN MKR DOCD: CPT | Performed by: PODIATRIST

## 2024-01-03 NOTE — PROGRESS NOTES
1/3/24  Noemy Iniguez : 1955 Sex: female  Age: 68 y.o.    Patient was referred by David Clayton DO    Chief Complaint   Patient presents with    Foot Injury     Right ankle fx sent for xrays        SUBJECTIVE patient is seen today for follow-up of right ankle fracture she is doing fairly well  Foot Injury       Review of Systems  Const: Denies constitutional symptoms  Musculo: Denies symptoms other than stated above  Skin: Denies symptoms other than stated above       Current Outpatient Medications:     atenolol (TENORMIN) 50 MG tablet, Take 1 tablet by mouth daily, Disp: 90 tablet, Rfl: 3    levothyroxine (SYNTHROID) 25 MCG tablet, Take 1 tablet by mouth Daily, Disp: 90 tablet, Rfl: 3    atorvastatin (LIPITOR) 20 MG tablet, Take 1 tablet by mouth daily, Disp: 90 tablet, Rfl: 3  Allergies   Allergen Reactions    Sulfa Antibiotics        No past medical history on file.  No past surgical history on file.  No family history on file.  Social History     Socioeconomic History    Marital status:      Spouse name: Not on file    Number of children: Not on file    Years of education: Not on file    Highest education level: Not on file   Occupational History    Not on file   Tobacco Use    Smoking status: Never    Smokeless tobacco: Never   Vaping Use    Vaping Use: Never used   Substance and Sexual Activity    Alcohol use: Not Currently    Drug use: Not Currently    Sexual activity: Not Currently   Other Topics Concern    Not on file   Social History Narrative     for 20 years.    3 children.  A boy and girl in town and one boy out of town    Boyfriend of 10 years  suddenly in 2019 well on her couch at age 52.  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    Hyperlipidemia    Hypertension    Hypothyroidism    GERD

## 2024-02-14 ENCOUNTER — OFFICE VISIT (OUTPATIENT)
Dept: PODIATRY | Age: 69
End: 2024-02-14
Payer: COMMERCIAL

## 2024-02-14 VITALS — BODY MASS INDEX: 24.37 KG/M2 | WEIGHT: 129 LBS | TEMPERATURE: 98.1 F

## 2024-02-14 DIAGNOSIS — S82.891D CLOSED FRACTURE OF RIGHT ANKLE WITH ROUTINE HEALING, SUBSEQUENT ENCOUNTER: Primary | ICD-10-CM

## 2024-02-14 DIAGNOSIS — E78.2 MIXED HYPERLIPIDEMIA: ICD-10-CM

## 2024-02-14 DIAGNOSIS — I10 ESSENTIAL HYPERTENSION: ICD-10-CM

## 2024-02-14 LAB
CHOLESTEROL: 219 MG/DL
HDLC SERPL-MCNC: 68 MG/DL
LDL CHOLESTEROL: 132 MG/DL
TRIGL SERPL-MCNC: 96 MG/DL
VLDLC SERPL CALC-MCNC: 19 MG/DL

## 2024-02-14 PROCEDURE — 99213 OFFICE O/P EST LOW 20 MIN: CPT | Performed by: PODIATRIST

## 2024-02-14 PROCEDURE — 1123F ACP DISCUSS/DSCN MKR DOCD: CPT | Performed by: PODIATRIST

## 2024-02-14 NOTE — PROGRESS NOTES
2024   Noemy ANDREI Iniguez : 1955 Sex: female  Age: 68 y.o.    Patient was referred by David Clayton DO    Chief Complaint   Patient presents with    Foot Injury     Right ankle fx f/u        SUBJECTIVE patient is seen today for follow-up of right ankle fracture she is doing fairly well  Foot Injury       Review of Systems  Const: Denies constitutional symptoms  Musculo: Denies symptoms other than stated above  Skin: Denies symptoms other than stated above       Current Outpatient Medications:     diclofenac sodium (VOLTAREN) 1 % GEL, Apply 4 g topically 4 times daily, Disp: 350 g, Rfl: 0    atenolol (TENORMIN) 50 MG tablet, Take 1 tablet by mouth daily, Disp: 90 tablet, Rfl: 3    levothyroxine (SYNTHROID) 25 MCG tablet, Take 1 tablet by mouth Daily, Disp: 90 tablet, Rfl: 3    atorvastatin (LIPITOR) 20 MG tablet, Take 1 tablet by mouth daily, Disp: 90 tablet, Rfl: 3  Allergies   Allergen Reactions    Sulfa Antibiotics        No past medical history on file.  No past surgical history on file.  No family history on file.  Social History     Socioeconomic History    Marital status:      Spouse name: Not on file    Number of children: Not on file    Years of education: Not on file    Highest education level: Not on file   Occupational History    Not on file   Tobacco Use    Smoking status: Never    Smokeless tobacco: Never   Vaping Use    Vaping Use: Never used   Substance and Sexual Activity    Alcohol use: Not Currently    Drug use: Not Currently    Sexual activity: Not Currently   Other Topics Concern    Not on file   Social History Narrative     for 20 years.    3 children.  A boy and girl in town and one boy out of town    Boyfriend of 10 years  suddenly in 2019 well on her couch at age 52.  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

## 2024-02-21 ENCOUNTER — OFFICE VISIT (OUTPATIENT)
Dept: PRIMARY CARE CLINIC | Age: 69
End: 2024-02-21
Payer: COMMERCIAL

## 2024-02-21 VITALS — HEIGHT: 61 IN | WEIGHT: 137 LBS | TEMPERATURE: 98.8 F | BODY MASS INDEX: 25.86 KG/M2

## 2024-02-21 DIAGNOSIS — M81.0 AGE-RELATED OSTEOPOROSIS WITHOUT CURRENT PATHOLOGICAL FRACTURE: ICD-10-CM

## 2024-02-21 DIAGNOSIS — E11.9 DIET-CONTROLLED DIABETES MELLITUS (HCC): ICD-10-CM

## 2024-02-21 DIAGNOSIS — Z12.31 BREAST CANCER SCREENING BY MAMMOGRAM: Primary | ICD-10-CM

## 2024-02-21 DIAGNOSIS — E03.9 ACQUIRED HYPOTHYROIDISM: ICD-10-CM

## 2024-02-21 DIAGNOSIS — E53.8 VITAMIN B 12 DEFICIENCY: ICD-10-CM

## 2024-02-21 DIAGNOSIS — E78.2 MIXED HYPERLIPIDEMIA: ICD-10-CM

## 2024-02-21 DIAGNOSIS — I10 ESSENTIAL HYPERTENSION: ICD-10-CM

## 2024-02-21 PROCEDURE — 99214 OFFICE O/P EST MOD 30 MIN: CPT | Performed by: FAMILY MEDICINE

## 2024-02-21 PROCEDURE — 1123F ACP DISCUSS/DSCN MKR DOCD: CPT | Performed by: FAMILY MEDICINE

## 2024-02-21 RX ORDER — LEVOTHYROXINE SODIUM 0.03 MG/1
25 TABLET ORAL DAILY
Qty: 90 TABLET | Refills: 3 | Status: SHIPPED | OUTPATIENT
Start: 2024-02-21

## 2024-02-21 RX ORDER — ATENOLOL 50 MG/1
50 TABLET ORAL DAILY
Qty: 90 TABLET | Refills: 3 | Status: SHIPPED | OUTPATIENT
Start: 2024-02-21

## 2024-02-21 RX ORDER — ATORVASTATIN CALCIUM 40 MG/1
40 TABLET, FILM COATED ORAL DAILY
Qty: 90 TABLET | Refills: 5 | Status: SHIPPED | OUTPATIENT
Start: 2024-02-21

## 2024-02-21 ASSESSMENT — ENCOUNTER SYMPTOMS
ALLERGIC/IMMUNOLOGIC NEGATIVE: 1
EYES NEGATIVE: 1
RESPIRATORY NEGATIVE: 1
GASTROINTESTINAL NEGATIVE: 1

## 2024-02-21 NOTE — PROGRESS NOTES
light.   Cardiovascular:      Rate and Rhythm: Normal rate and regular rhythm.   Pulmonary:      Effort: Pulmonary effort is normal.      Breath sounds: Normal breath sounds.   Abdominal:      General: Bowel sounds are normal.      Palpations: Abdomen is soft.   Musculoskeletal:         General: Normal range of motion.      Cervical back: Normal range of motion.   Skin:     General: Skin is warm.   Neurological:      Mental Status: She is alert and oriented to person, place, and time.   Psychiatric:         Behavior: Behavior normal.         Noemy was seen today for annual exam and other.    Diagnoses and all orders for this visit:    Breast cancer screening by mammogram  -     YULIYA DIGITAL SCREEN BILATERAL PER PROTOCOL; Future    Essential hypertension  -     atenolol (TENORMIN) 50 MG tablet; Take 1 tablet by mouth daily    Mixed hyperlipidemia  -     atorvastatin (LIPITOR) 40 MG tablet; Take 1 tablet by mouth daily    Acquired hypothyroidism  -     levothyroxine (SYNTHROID) 25 MCG tablet; Take 1 tablet by mouth Daily        Comments: inc lipitor 40 mg       diet exer lwo fat  diet    4 mo lab before    ;.I educated the patient about all medications.  Make sure they were correct and educated  on the risk associated with missing meds or taking them incorrectly.  A list of medications is being sent home with patient today.      Check blood pressure at home twice a day.  Low-salt low caffeine diet.  Call if systolic blood pressure is above 150 and diastolic blood pressures above 85.  Only use a upper arm digital cuff.        A great deal of time spent reviewing medications, diet, exercise, social issues. Also reviewing the chart before entering the room with patient and finishing charting after leaving patient's room. More than half of that time was spent face to face with the patient in counseling and coordinating care.    Aggressive low-fat diet.  Avoid red meats, greasy fried foods, dairy products.  Avoid

## 2024-04-01 DIAGNOSIS — E03.9 ACQUIRED HYPOTHYROIDISM: ICD-10-CM

## 2024-04-01 DIAGNOSIS — E78.2 MIXED HYPERLIPIDEMIA: ICD-10-CM

## 2024-04-02 RX ORDER — LEVOTHYROXINE SODIUM 0.03 MG/1
25 TABLET ORAL DAILY
Qty: 90 TABLET | Refills: 3 | Status: SHIPPED | OUTPATIENT
Start: 2024-04-02

## 2024-04-02 RX ORDER — ATORVASTATIN CALCIUM 40 MG/1
40 TABLET, FILM COATED ORAL DAILY
Qty: 90 TABLET | Refills: 5 | Status: SHIPPED | OUTPATIENT
Start: 2024-04-02

## 2024-04-18 ENCOUNTER — TELEPHONE (OUTPATIENT)
Dept: PODIATRY | Age: 69
End: 2024-04-18

## 2024-04-18 RX ORDER — PREDNISONE 10 MG/1
TABLET ORAL
Qty: 18 TABLET | Refills: 1 | Status: SHIPPED | OUTPATIENT
Start: 2024-04-18

## 2024-07-29 DIAGNOSIS — I10 ESSENTIAL HYPERTENSION: ICD-10-CM

## 2024-07-29 DIAGNOSIS — M81.0 AGE-RELATED OSTEOPOROSIS WITHOUT CURRENT PATHOLOGICAL FRACTURE: ICD-10-CM

## 2024-07-29 DIAGNOSIS — E03.9 ACQUIRED HYPOTHYROIDISM: ICD-10-CM

## 2024-07-29 DIAGNOSIS — E11.9 DIET-CONTROLLED DIABETES MELLITUS (HCC): ICD-10-CM

## 2024-07-29 DIAGNOSIS — E78.2 MIXED HYPERLIPIDEMIA: ICD-10-CM

## 2024-07-29 DIAGNOSIS — E53.8 VITAMIN B 12 DEFICIENCY: ICD-10-CM

## 2024-07-29 LAB
ALBUMIN: 4.3 G/DL (ref 3.5–5.2)
ALP BLD-CCNC: 77 U/L (ref 35–104)
ALT SERPL-CCNC: 44 U/L (ref 0–32)
ANION GAP SERPL CALCULATED.3IONS-SCNC: 13 MMOL/L (ref 7–16)
AST SERPL-CCNC: 37 U/L (ref 0–31)
BASOPHILS ABSOLUTE: 0.04 K/UL (ref 0–0.2)
BASOPHILS RELATIVE PERCENT: 1 % (ref 0–2)
BILIRUB SERPL-MCNC: 0.5 MG/DL (ref 0–1.2)
BUN BLDV-MCNC: 15 MG/DL (ref 6–23)
CALCIUM SERPL-MCNC: 9.6 MG/DL (ref 8.6–10.2)
CHLORIDE BLD-SCNC: 102 MMOL/L (ref 98–107)
CHOLESTEROL, TOTAL: 221 MG/DL
CO2: 24 MMOL/L (ref 22–29)
CREAT SERPL-MCNC: 0.6 MG/DL (ref 0.5–1)
EOSINOPHILS ABSOLUTE: 0.05 K/UL (ref 0.05–0.5)
EOSINOPHILS RELATIVE PERCENT: 1 % (ref 0–6)
GFR, ESTIMATED: >90 ML/MIN/1.73M2
GLUCOSE BLD-MCNC: 109 MG/DL (ref 74–99)
HBA1C MFR BLD: 5.4 % (ref 4–5.6)
HCT VFR BLD CALC: 42 % (ref 34–48)
HDLC SERPL-MCNC: 78 MG/DL
HEMOGLOBIN: 13.6 G/DL (ref 11.5–15.5)
IMMATURE GRANULOCYTES %: 0 % (ref 0–5)
IMMATURE GRANULOCYTES ABSOLUTE: <0.03 K/UL (ref 0–0.58)
LDL CHOLESTEROL: 127 MG/DL
LYMPHOCYTES ABSOLUTE: 1.78 K/UL (ref 1.5–4)
LYMPHOCYTES RELATIVE PERCENT: 44 % (ref 20–42)
MCH RBC QN AUTO: 32.2 PG (ref 26–35)
MCHC RBC AUTO-ENTMCNC: 32.4 G/DL (ref 32–34.5)
MCV RBC AUTO: 99.5 FL (ref 80–99.9)
MONOCYTES ABSOLUTE: 0.39 K/UL (ref 0.1–0.95)
MONOCYTES RELATIVE PERCENT: 10 % (ref 2–12)
NEUTROPHILS ABSOLUTE: 1.82 K/UL (ref 1.8–7.3)
NEUTROPHILS RELATIVE PERCENT: 45 % (ref 43–80)
PDW BLD-RTO: 12.8 % (ref 11.5–15)
PLATELET # BLD: 201 K/UL (ref 130–450)
PMV BLD AUTO: 10.2 FL (ref 7–12)
POTASSIUM SERPL-SCNC: 4.7 MMOL/L (ref 3.5–5)
RBC # BLD: 4.22 M/UL (ref 3.5–5.5)
SODIUM BLD-SCNC: 139 MMOL/L (ref 132–146)
THYROXINE (T4): 6.5 UG/DL (ref 4.5–11.7)
TOTAL PROTEIN: 7 G/DL (ref 6.4–8.3)
TRIGL SERPL-MCNC: 79 MG/DL
TSH SERPL DL<=0.05 MIU/L-ACNC: 1.71 UIU/ML (ref 0.27–4.2)
VITAMIN B-12: 351 PG/ML (ref 211–946)
VITAMIN D 25-HYDROXY: 40.4 NG/ML (ref 30–100)
VLDLC SERPL CALC-MCNC: 16 MG/DL
WBC # BLD: 4.1 K/UL (ref 4.5–11.5)

## 2024-07-30 ENCOUNTER — OFFICE VISIT (OUTPATIENT)
Dept: PRIMARY CARE CLINIC | Age: 69
End: 2024-07-30
Payer: COMMERCIAL

## 2024-07-30 VITALS
SYSTOLIC BLOOD PRESSURE: 132 MMHG | WEIGHT: 136.4 LBS | BODY MASS INDEX: 25.77 KG/M2 | DIASTOLIC BLOOD PRESSURE: 78 MMHG | TEMPERATURE: 97.8 F | RESPIRATION RATE: 17 BRPM | HEART RATE: 60 BPM | OXYGEN SATURATION: 97 %

## 2024-07-30 DIAGNOSIS — K21.9 GASTROESOPHAGEAL REFLUX DISEASE WITHOUT ESOPHAGITIS: Chronic | ICD-10-CM

## 2024-07-30 DIAGNOSIS — E03.9 ACQUIRED HYPOTHYROIDISM: Chronic | ICD-10-CM

## 2024-07-30 DIAGNOSIS — E78.2 MIXED HYPERLIPIDEMIA: Chronic | ICD-10-CM

## 2024-07-30 DIAGNOSIS — E78.2 MIXED HYPERLIPIDEMIA: ICD-10-CM

## 2024-07-30 DIAGNOSIS — Z12.11 SCREENING FOR COLORECTAL CANCER: ICD-10-CM

## 2024-07-30 DIAGNOSIS — Z12.12 SCREENING FOR COLORECTAL CANCER: ICD-10-CM

## 2024-07-30 DIAGNOSIS — R73.01 IMPAIRED FASTING GLUCOSE: ICD-10-CM

## 2024-07-30 DIAGNOSIS — I10 ESSENTIAL HYPERTENSION: ICD-10-CM

## 2024-07-30 DIAGNOSIS — I10 ESSENTIAL HYPERTENSION: Primary | Chronic | ICD-10-CM

## 2024-07-30 DIAGNOSIS — E53.8 VITAMIN B 12 DEFICIENCY: ICD-10-CM

## 2024-07-30 DIAGNOSIS — M81.0 AGE-RELATED OSTEOPOROSIS WITHOUT CURRENT PATHOLOGICAL FRACTURE: ICD-10-CM

## 2024-07-30 LAB
BILIRUBIN, URINE: NEGATIVE
COLOR, UA: YELLOW
COMMENT: NORMAL
GLUCOSE URINE: NEGATIVE MG/DL
KETONES, URINE: NEGATIVE MG/DL
LEUKOCYTE ESTERASE, URINE: NEGATIVE
NITRITE, URINE: NEGATIVE
PH, URINE: 6 (ref 5–9)
PROTEIN UA: NEGATIVE MG/DL
SPECIFIC GRAVITY UA: 1.01 (ref 1–1.03)
TURBIDITY: CLEAR
URINE HGB: NEGATIVE
UROBILINOGEN, URINE: 0.2 EU/DL (ref 0–1)

## 2024-07-30 PROCEDURE — 1123F ACP DISCUSS/DSCN MKR DOCD: CPT | Performed by: FAMILY MEDICINE

## 2024-07-30 PROCEDURE — 3075F SYST BP GE 130 - 139MM HG: CPT | Performed by: FAMILY MEDICINE

## 2024-07-30 PROCEDURE — 3078F DIAST BP <80 MM HG: CPT | Performed by: FAMILY MEDICINE

## 2024-07-30 PROCEDURE — 99214 OFFICE O/P EST MOD 30 MIN: CPT | Performed by: FAMILY MEDICINE

## 2024-07-30 SDOH — ECONOMIC STABILITY: FOOD INSECURITY: WITHIN THE PAST 12 MONTHS, YOU WORRIED THAT YOUR FOOD WOULD RUN OUT BEFORE YOU GOT MONEY TO BUY MORE.: NEVER TRUE

## 2024-07-30 SDOH — ECONOMIC STABILITY: FOOD INSECURITY: WITHIN THE PAST 12 MONTHS, THE FOOD YOU BOUGHT JUST DIDN'T LAST AND YOU DIDN'T HAVE MONEY TO GET MORE.: NEVER TRUE

## 2024-07-30 SDOH — ECONOMIC STABILITY: INCOME INSECURITY: HOW HARD IS IT FOR YOU TO PAY FOR THE VERY BASICS LIKE FOOD, HOUSING, MEDICAL CARE, AND HEATING?: NOT HARD AT ALL

## 2024-07-30 ASSESSMENT — PATIENT HEALTH QUESTIONNAIRE - PHQ9
1. LITTLE INTEREST OR PLEASURE IN DOING THINGS: NOT AT ALL
1. LITTLE INTEREST OR PLEASURE IN DOING THINGS: NOT AT ALL
SUM OF ALL RESPONSES TO PHQ QUESTIONS 1-9: 0
2. FEELING DOWN, DEPRESSED OR HOPELESS: NOT AT ALL
SUM OF ALL RESPONSES TO PHQ9 QUESTIONS 1 & 2: 0
SUM OF ALL RESPONSES TO PHQ9 QUESTIONS 1 & 2: 0
SUM OF ALL RESPONSES TO PHQ QUESTIONS 1-9: 0
2. FEELING DOWN, DEPRESSED OR HOPELESS: NOT AT ALL

## 2024-07-30 ASSESSMENT — ENCOUNTER SYMPTOMS
EYES NEGATIVE: 1
RESPIRATORY NEGATIVE: 1
GASTROINTESTINAL NEGATIVE: 1
ALLERGIC/IMMUNOLOGIC NEGATIVE: 1

## 2024-07-30 NOTE — PROGRESS NOTES
24  Name: Noemy Iniguez    : 1955    Sex: female    Age: 69 y.o.        Subjective:  Chief Complaint: Patient is here for 4 mo ck   bp chol  LFRT   gerd  arth  allergies     Feel ok  plan reited   anxious about it  La bntoed LFT  same        Review of Systems   Constitutional: Negative.    HENT: Negative.     Eyes: Negative.    Respiratory: Negative.     Cardiovascular: Negative.    Gastrointestinal: Negative.    Endocrine: Negative.    Genitourinary: Negative.    Musculoskeletal: Negative.    Skin: Negative.    Allergic/Immunologic: Negative.    Neurological: Negative.    Hematological: Negative.    Psychiatric/Behavioral: Negative.           Current Outpatient Medications:     predniSONE (DELTASONE) 10 MG tablet, 3 tabs  Qd x 3  days   2 tabs qd x 3 days  1 tab qd  X 3 days, Disp: 18 tablet, Rfl: 1    levothyroxine (SYNTHROID) 25 MCG tablet, Take 1 tablet by mouth Daily, Disp: 90 tablet, Rfl: 3    atorvastatin (LIPITOR) 40 MG tablet, Take 1 tablet by mouth daily, Disp: 90 tablet, Rfl: 5    atenolol (TENORMIN) 50 MG tablet, Take 1 tablet by mouth daily, Disp: 90 tablet, Rfl: 3    diclofenac sodium (VOLTAREN) 1 % GEL, Apply 4 g topically 4 times daily, Disp: 350 g, Rfl: 0  Allergies   Allergen Reactions    Sulfa Antibiotics      Social History     Socioeconomic History    Marital status:      Spouse name: Not on file    Number of children: Not on file    Years of education: Not on file    Highest education level: Not on file   Occupational History    Not on file   Tobacco Use    Smoking status: Never    Smokeless tobacco: Never   Vaping Use    Vaping Use: Never used   Substance and Sexual Activity    Alcohol use: Not Currently    Drug use: Not Currently    Sexual activity: Not Currently   Other Topics Concern    Not on file   Social History Narrative     for 20 years.    3 children.  A boy and girl in town and one boy out of town---[place]  -boy in town moved to  [place]---krish not

## 2024-10-07 DIAGNOSIS — I10 ESSENTIAL HYPERTENSION: ICD-10-CM

## 2024-10-07 DIAGNOSIS — E78.2 MIXED HYPERLIPIDEMIA: ICD-10-CM

## 2024-10-07 DIAGNOSIS — E03.9 ACQUIRED HYPOTHYROIDISM: ICD-10-CM

## 2024-10-07 RX ORDER — ATORVASTATIN CALCIUM 40 MG/1
40 TABLET, FILM COATED ORAL DAILY
Qty: 90 TABLET | Refills: 3 | Status: SHIPPED | OUTPATIENT
Start: 2024-10-07

## 2024-10-07 RX ORDER — LEVOTHYROXINE SODIUM 25 UG/1
25 TABLET ORAL DAILY
Qty: 90 TABLET | Refills: 3 | Status: SHIPPED | OUTPATIENT
Start: 2024-10-07

## 2024-10-07 RX ORDER — ATENOLOL 50 MG/1
50 TABLET ORAL DAILY
Qty: 90 TABLET | Refills: 3 | Status: SHIPPED | OUTPATIENT
Start: 2024-10-07

## 2024-10-07 RX ORDER — ATENOLOL 50 MG/1
50 TABLET ORAL DAILY
Qty: 10 TABLET | Refills: 0 | Status: SHIPPED
Start: 2024-10-07 | End: 2024-10-07 | Stop reason: SDUPTHER

## 2025-01-13 DIAGNOSIS — E03.9 ACQUIRED HYPOTHYROIDISM: ICD-10-CM

## 2025-01-13 RX ORDER — LEVOTHYROXINE SODIUM 25 UG/1
25 TABLET ORAL DAILY
Qty: 10 TABLET | Refills: 0 | Status: SHIPPED | OUTPATIENT
Start: 2025-01-13